# Patient Record
Sex: FEMALE | Race: WHITE | NOT HISPANIC OR LATINO | URBAN - METROPOLITAN AREA
[De-identification: names, ages, dates, MRNs, and addresses within clinical notes are randomized per-mention and may not be internally consistent; named-entity substitution may affect disease eponyms.]

---
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
## 2025-05-08 ENCOUNTER — TRANSCRIBE ORDERS (OUTPATIENT)
Dept: SCHEDULING | Facility: REHABILITATION | Age: 20
End: 2025-05-08

## 2025-05-08 DIAGNOSIS — H55.09 NYSTAGMUS, VESTIBULAR: ICD-10-CM

## 2025-05-08 DIAGNOSIS — S06.0X0A CONCUSSION WITH NO LOSS OF CONSCIOUSNESS: Primary | ICD-10-CM

## 2025-05-08 DIAGNOSIS — H81.90 NYSTAGMUS, VESTIBULAR: ICD-10-CM

## 2025-05-29 ENCOUNTER — HOSPITAL ENCOUNTER (OUTPATIENT)
Dept: PHYSICAL THERAPY | Facility: REHABILITATION | Age: 20
Setting detail: THERAPIES SERIES
Discharge: HOME | End: 2025-05-29
Attending: FAMILY MEDICINE
Payer: COMMERCIAL

## 2025-05-29 DIAGNOSIS — S06.0X0A CONCUSSION WITH NO LOSS OF CONSCIOUSNESS: ICD-10-CM

## 2025-05-29 DIAGNOSIS — S06.0X0A CONCUSSION WITHOUT LOSS OF CONSCIOUSNESS, INITIAL ENCOUNTER: Primary | ICD-10-CM

## 2025-05-29 DIAGNOSIS — H55.09 NYSTAGMUS, VESTIBULAR: ICD-10-CM

## 2025-05-29 DIAGNOSIS — H81.90 NYSTAGMUS, VESTIBULAR: ICD-10-CM

## 2025-05-29 PROBLEM — R55 NEAR SYNCOPE: Status: ACTIVE | Noted: 2021-08-03

## 2025-05-29 PROBLEM — M62.9 MUSCULOSKELETAL DISORDER INVOLVING UPPER TRAPEZIUS MUSCLE: Status: ACTIVE | Noted: 2025-05-29

## 2025-05-29 PROBLEM — R00.2 PALPITATIONS: Status: ACTIVE | Noted: 2021-08-03

## 2025-05-29 PROBLEM — G89.29 CHRONIC NECK PAIN: Status: ACTIVE | Noted: 2025-05-29

## 2025-05-29 PROBLEM — G90.A POSTURAL ORTHOSTATIC TACHYCARDIA SYNDROME (POTS): Status: ACTIVE | Noted: 2025-05-29

## 2025-05-29 PROBLEM — M54.2 CHRONIC NECK PAIN: Status: ACTIVE | Noted: 2025-05-29

## 2025-05-29 PROCEDURE — 97163 PT EVAL HIGH COMPLEX 45 MIN: CPT | Mod: GP

## 2025-05-29 PROCEDURE — 97530 THERAPEUTIC ACTIVITIES: CPT | Mod: GP

## 2025-05-29 RX ORDER — LORATADINE 10 MG/1
10 TABLET ORAL DAILY
COMMUNITY

## 2025-05-29 RX ORDER — SUMATRIPTAN SUCCINATE 25 MG/1
25 TABLET ORAL ONCE AS NEEDED
COMMUNITY
Start: 2021-06-01

## 2025-05-29 RX ORDER — DEXTROAMPHETAMINE SACCHARATE, AMPHETAMINE ASPARTATE, DEXTROAMPHETAMINE SULFATE AND AMPHETAMINE SULFATE 1.25; 1.25; 1.25; 1.25 MG/1; MG/1; MG/1; MG/1
5 TABLET ORAL AS NEEDED
COMMUNITY

## 2025-05-30 ENCOUNTER — DOCUMENTATION (OUTPATIENT)
Dept: SOCIAL WORK | Facility: REHABILITATION | Age: 20
End: 2025-05-30
Payer: COMMERCIAL

## 2025-06-04 ENCOUNTER — HOSPITAL ENCOUNTER (OUTPATIENT)
Dept: PHYSICAL THERAPY | Facility: REHABILITATION | Age: 20
Setting detail: THERAPIES SERIES
Discharge: HOME | End: 2025-06-04
Attending: FAMILY MEDICINE
Payer: COMMERCIAL

## 2025-06-04 DIAGNOSIS — H55.09 NYSTAGMUS, VESTIBULAR: ICD-10-CM

## 2025-06-04 DIAGNOSIS — H81.90 NYSTAGMUS, VESTIBULAR: ICD-10-CM

## 2025-06-04 DIAGNOSIS — S06.0X0A CONCUSSION WITHOUT LOSS OF CONSCIOUSNESS, INITIAL ENCOUNTER: Primary | ICD-10-CM

## 2025-06-04 PROCEDURE — 97530 THERAPEUTIC ACTIVITIES: CPT | Mod: GP

## 2025-06-04 PROCEDURE — 97112 NEUROMUSCULAR REEDUCATION: CPT | Mod: GP
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
## 2025-06-04 NOTE — OP PT TREATMENT LOG
P.T. TREATMENT LOG    Treatment Current Session Time    CANALITH  REPOSITIONING TREATMENT  (CPT 69241) Y/N Notes Not performed SYMPTOMS   CRT       NEURO RE-ED  (CPT 18165) Y/N Notes 23-37 Minutes   SYMPTOMS   Oculomotor exam yes See vestibular chapter    BPPV ASSESSMENT yes See vestibular chapter    VOR CANCELLATION                       Standing H/VVOR-C yes See vestibular chapter   (Assessed - unable to tolerate full 60s without > 2 point inc in symptoms, especially in vertical plane)    Ambulation w/ H/VVOR-C      VOR / GAZE STABILITY      Standing H/VVOR yes See vestibular chapter   (Assessed - unable to perform at 120bpm without symptom increase > 2 points from baseline especially in vertical plane).    Ambulation w/H/VVOR      H/VVOR on compliant surfaces      H/VVOR on sway surface      H/VVOR w/ linear movement      Functional VOR      HABITUATION      Bending      Turning      Figure 8s      Repetitive functional movements      BALANCE- STATIC      On floor      Airex foam      Rockerboard      LOS TRAINING      Weight shifting w/ ankle strategy      Reaching w/ ankle strategy      Biodex training      SLS STABILITY      Alternating toe taps      Slow marching      High knee walking      Cone tapping       BALANCE- DYNAMIC      Ambulation-head turns/nods      Ambulation - 180 & 360 degree turns      Retro ambulation EO      Ambulation with EC      Obstacles      Tandem      REACTIVE BALANCE      Perturbation on rocker/airex      Ball tap on airex      Airex/rocker step ups      Rebounder ball toss on rocker      Ball tap while walking      STS w/ TB around hips      Backward step to bounce off large therapy ball > fwd step      Lateral step w/ TB around hips      Backward step w/ TB around hips      LE COORDINATION      Floor ladder      4 squares      MULTITASKING      Reading task w/ gait activity      Cognitive task w/ gait activity      Negotiating obstacles while carrying ball on cone      OBJECTIVE  MEASURES      modMSQ yes 6/4 (FFU): 6.4 with 5 positions, mild    MSQ      FGA      SOT      DVA      Gait Speed      BCTT      THER-EX   (CPT 48302) Y/N SETS REPS LOAD Not performed SYMPTOMS   STRETCHING                           STRENGTHENING         LE STRENGTHENING                                                      CARDIOVASCULAR      NuStep      Recumbent bike      Treadmill      THER ACT  (CPT 80324) Y/N  25 Minutes SYMPTOMS   Review pain, meds, falls, vitals, symptoms y Reviewed with patient    *Subjective Measures       DHI  IE: 44                       ABC  IE: 84.3    HEP y - 20 minute daily walks maintaining symptoms within 2 points from baseline  - VOR x1 @ 100bpm x60s ea, 3-4x/daily (reviewed maintaining symptoms 2 points from baseline, reducing speed if symptoms are increasing past 2 point threshold or if symptoms lasting > 20 minutes).  - VOR-C @ SSV x30s ea, 3x/day    Therapeutic Rest yes Throughout session for symptom management    Patient Education yes Educated pt on rationale for assessments performed today, findings of assessments, downbeating nystagmus consistent with central origin possibly 2' to concussion, purpose of the VOR and rationale for assessment/provided HEP, technique/form of exercises, rationale for motion sensitivity assessment and plan to address in the future, results of MSQ, possible reasons for increased eye strain -Pt verbalized understanding.           modified Motion Sensitivity Test     Date__6/4/25_    All movements are done in standing, eyes are open, in front of a plain wall  Symptoms must return to baseline before the next movement is performed  Mayelin records the change of intensity from baseline, then after the movement is completed    Intensity 0-10  Duration <5 s =0  Score = Intensity + Duration    0 = none   5-10 s =1    10 = severe   11-20s =2                                                   21-30s =3                                                   >30  s =4    Baseline Symptoms = 0  MOVEMENT Intensity Duration Score   1. 5x Horizontal head turns 1 13s (2) 3   2. 5x Vertical head turns 1 15s (2) 3   3. 5x Right diagonal head turns  (upper left down to right) 0     4. 5x Left diagonal head turns  (upper right down to left) 0     5. 5x Trunk Bends  (bending knees reaching to floor) 2 21s (3) 5   6. 5x Right quarter body turns  (Look over right shoulder with trunk rotation, feet planted) 0     7. 5x Left quarter body turns (Look over left shoulder with trunk rotation, feet planted) 1 9s (1) 2   8. 1x 360 degree turn to right 0     9. 1x 360 degree turn to left 0     10. 5x VOR cancellation  (follow thumbs horizontally with head/trunk rotation X45 degrees  each way) 2 30s (3) 5   Total score   18   mMSQ = Total score x (# of positions) / 14.00 MSQ = 6.4  0-10 mild range  11-30 moderate   severe

## 2025-06-06 ENCOUNTER — HOSPITAL ENCOUNTER (OUTPATIENT)
Dept: PHYSICAL THERAPY | Facility: REHABILITATION | Age: 20
Setting detail: THERAPIES SERIES
Discharge: HOME | End: 2025-06-06
Attending: FAMILY MEDICINE
Payer: COMMERCIAL

## 2025-06-06 DIAGNOSIS — S06.0X0A CONCUSSION WITHOUT LOSS OF CONSCIOUSNESS, INITIAL ENCOUNTER: Primary | ICD-10-CM

## 2025-06-06 DIAGNOSIS — H81.90 NYSTAGMUS, VESTIBULAR: ICD-10-CM

## 2025-06-06 DIAGNOSIS — H55.09 NYSTAGMUS, VESTIBULAR: ICD-10-CM

## 2025-06-06 PROCEDURE — 97112 NEUROMUSCULAR REEDUCATION: CPT | Mod: GP

## 2025-06-06 PROCEDURE — 97530 THERAPEUTIC ACTIVITIES: CPT | Mod: GP

## 2025-06-06 NOTE — OP PT TREATMENT LOG
"P.T. TREATMENT LOG    Treatment Current Session Time    CANALITH  REPOSITIONING TREATMENT  (CPT 57451) Y/N Notes Not performed SYMPTOMS   CRT       NEURO RE-ED  (CPT 62546) Y/N Notes 23-37 Minutes   SYMPTOMS   Oculomotor exam  See vestibular chapter    BPPV ASSESSMENT  See vestibular chapter    VOR CANCELLATION                       Standing H/VVOR-C Reviewed verbally for HEP See vestibular chapter   (Assessed - unable to tolerate full 60s without > 2 point inc in symptoms, especially in vertical plane)    Ambulation w/ H/VVOR-C      VOR / GAZE STABILITY      Standing H/VVOR yes BASELINE: 1.5/10 eye strain  Standing 4' from 1\" B on blue background:  HVOR @ 100 > 105bpm x60s: eye strain inc 2.5/10.  1/10 dizziness.    - standing rest for symptom resolution  VVOR @ 100 > 103bpm x60s: eye train inc to 2.5/10 and 1/10 dizziness.     Ambulation w/H/VVOR      H/VVOR on compliant surfaces      H/VVOR on sway surface      H/VVOR w/ linear movement      Functional VOR      HABITUATION      Bending      Turning      Figure 8s      Repetitive functional movements      BALANCE- STATIC      On floor      Airex foam      Rockerboard      LOS TRAINING      Weight shifting w/ ankle strategy      Reaching w/ ankle strategy      Biodex training      SLS STABILITY      Alternating toe taps      Slow marching      High knee walking      Cone tapping       BALANCE- DYNAMIC      Ambulation-head turns/nods      Ambulation - 180 & 360 degree turns      Retro ambulation EO      Ambulation with EC      Obstacles      Tandem      REACTIVE BALANCE      Perturbation on rocker/airex      Ball tap on airex      Airex/rocker step ups      Rebounder ball toss on rocker      Ball tap while walking      STS w/ TB around hips      Backward step to bounce off large therapy ball > fwd step      Lateral step w/ TB around hips      Backward step w/ TB around hips      LE COORDINATION      Floor ladder      4 squares      MULTITASKING      Reading task w/ " gait activity      Cognitive task w/ gait activity      Negotiating obstacles while carrying ball on cone      OBJECTIVE MEASURES      modMSQ  6/4 (FFU): 6.4 with 5 positions, mild    MSQ yes 6/6/25: 3.9% with 6 positions    FGA      SOT yes 6/6/25: all WNL    DVA      Gait Speed      BCTT      THER-EX   (CPT 61682) Y/N SETS REPS LOAD Not performed SYMPTOMS   STRETCHING                           STRENGTHENING         LE STRENGTHENING                                                      CARDIOVASCULAR      NuStep      Recumbent bike      Treadmill      THER ACT  (CPT 97450) Y/N  20 Minutes SYMPTOMS   Review pain, meds, falls, vitals, symptoms y Reviewed with patient    *Subjective Measures       DHI  IE: 44                       ABC  IE: 84.3    HEP y - 20 minute daily walks maintaining symptoms within 2 points from baseline  - VOR x1 @ 100bpm x60s ea, 3-4x/daily (reviewed maintaining symptoms 2 points from baseline, reducing speed if symptoms are increasing past 2 point threshold or if symptoms lasting > 20 minutes).  - VOR-C @ SSV x30s ea, 3x/day    Therapeutic Rest yes Throughout session for symptom management    Patient Education yes Educated pt on rationale for MSQ, finding of MSQ suggesting mild motion sensitivity, Educated pt on rationale of assessing SOT, procedure of SOT, results of SOTT, and balance integration strategies. Educated pt on progressing gaze stability, reviewed HEP and VOR-C verbally for HEP, provided VOR-C handout.  Educated pt on use of sunglasses/blue light lenses to reduce eye strain with bright lights/blue light; educated pt on use of night mode and lowering screen brightness for ipad/iphone/laptop.  Educated pt on OT referral and discussing symptoms with Dr. Quach.   -Pt verbalized understanding.           Date: _6/6/25 Baseline Level (0-5): 0  MSQ: 3.9%      Position Change Intensity Adjusted Intensity Duration Score   1.Sitting to Supine 0      2.Supine to Left Side Lying 0       3.Supine to Right Side Lying 0      4.Supine to Sitting 0.5  7s (1)  1.5   5.Left Hallpike 0.5  > 30s (3) 3.5   6.Return to Sitting 1  22s (2) 3   7.Right Hallpike 0      8.Return to Sitting 0.5  8s (1) 1.5   9.Sitting to Nose to Left Knee 0.5  7s (1) 1.5   10.Return to Sitting 0      11.Sitting to Nose to Right Knee 0.5  6s (1)    12.Return to Sitting 0      13.Sitting with Head Rotation (Side-Side 5x) 0      14.Sitting with Head Flex & Ext (Nod 5x) 0.5  16s (2) 2.5   15.Standing with Turn 180* to Right 0      16.Standing with Turn 180* to Left           Total   Score = 13.5      Intensity: Scale from 0 to 5 (0 = No sx; 5 = Severe sx per pt's subjective report)  Adjusted Intensity: Intensity Level - Baseline Level  Duration: Scale from 0 to 3 for return to baseline (5-10 sec = 1, 11-30 sec = 2, >30 sec = 3)  Score: Adjusted Intensity + Duration    Motion Sensitivity Quotient: # Provoking Positions x Total Score x100 =  ________________                                                                               2048                                 *If <16 positions are tested, 2048 is replaced with 8 x (# of positions tested)2   (ex: Tested 12 positions, so 8 x (12)2 = 1152 and new MSQ is (# Provoking Positions x Total Score)/1152    Vince FW, Mauricio MJ. Validity and reliability of the Motion Sensitivity Test.    Journal of Rehabilitation Research and Development. 2003;40(5):415-422.        modified Motion Sensitivity Test     Date__6/4/25_    All movements are done in standing, eyes are open, in front of a plain wall  Symptoms must return to baseline before the next movement is performed  Mayelin records the change of intensity from baseline, then after the movement is completed    Intensity 0-10  Duration <5 s =0  Score = Intensity + Duration    0 = none   5-10 s =1    10 = severe   11-20s =2                                                   21-30s =3                                                   >30  s =4    Baseline Symptoms = 0  MOVEMENT Intensity Duration Score   1. 5x Horizontal head turns 1 13s (2) 3   2. 5x Vertical head turns 1 15s (2) 3   3. 5x Right diagonal head turns  (upper left down to right) 0     4. 5x Left diagonal head turns  (upper right down to left) 0     5. 5x Trunk Bends  (bending knees reaching to floor) 2 21s (3) 5   6. 5x Right quarter body turns  (Look over right shoulder with trunk rotation, feet planted) 0     7. 5x Left quarter body turns (Look over left shoulder with trunk rotation, feet planted) 1 9s (1) 2   8. 1x 360 degree turn to right 0     9. 1x 360 degree turn to left 0     10. 5x VOR cancellation  (follow thumbs horizontally with head/trunk rotation X45 degrees  each way) 2 30s (3) 5   Total score   18   mMSQ = Total score x (# of positions) / 14.00 MSQ = 6.4  0-10 mild range  11-30 moderate   severe
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
## 2025-06-10 ENCOUNTER — HOSPITAL ENCOUNTER (OUTPATIENT)
Dept: PHYSICAL THERAPY | Facility: REHABILITATION | Age: 20
Setting detail: THERAPIES SERIES
Discharge: HOME | End: 2025-06-10
Attending: FAMILY MEDICINE
Payer: COMMERCIAL

## 2025-06-10 DIAGNOSIS — H55.09 NYSTAGMUS, VESTIBULAR: ICD-10-CM

## 2025-06-10 DIAGNOSIS — H81.90 NYSTAGMUS, VESTIBULAR: ICD-10-CM

## 2025-06-10 DIAGNOSIS — S06.0X0A CONCUSSION WITHOUT LOSS OF CONSCIOUSNESS, INITIAL ENCOUNTER: Primary | ICD-10-CM

## 2025-06-10 PROCEDURE — 97112 NEUROMUSCULAR REEDUCATION: CPT | Mod: GP

## 2025-06-10 PROCEDURE — 97530 THERAPEUTIC ACTIVITIES: CPT | Mod: GP

## 2025-06-10 RX ORDER — AMITRIPTYLINE HYDROCHLORIDE 25 MG/1
25 TABLET, FILM COATED ORAL NIGHTLY
COMMUNITY
Start: 2025-06-09
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
## 2025-06-10 NOTE — OP PT TREATMENT LOG
"P.T. TREATMENT LOG    Treatment Current Session Time    CANALITH  REPOSITIONING TREATMENT  (CPT 76913) Y/N Notes Not performed SYMPTOMS   CRT       NEURO RE-ED  (CPT 06954) Y/N Notes 23-37 Minutes   SYMPTOMS   Oculomotor exam  See vestibular chapter    BPPV ASSESSMENT  See vestibular chapter    VOR CANCELLATION                       Standing H/VVOR-C  See vestibular chapter   (Assessed - unable to tolerate full 60s without > 2 point inc in symptoms, especially in vertical plane)    Ambulation w/ H/VVOR-C      VOR / GAZE STABILITY      Standing H/VVOR Yes - all BASELINE: 0/10 eye strain  Standing 4' from 1\" B on blue background:  HVOR @ 105>110 bpm x60s: 0.5/10 dizziness, target clear    - standing rest for symptom resolution    VVOR @ 103 > bpm x60s: 0.5/10 dizziness, \"and a little bit of eye strain when my head is down and I'm looking at the B\"    Repeated above with busy background (red/white polka dots):  HVOR @ 110 bpm x60s: 1/10 dizziness, 2/10 eye strain.  Target clear but dots blurry.     VVOR @ 110 bpm x60s: 1/10 dizziness, 2/10 eye strain.      Ambulation w/H/VVOR      H/VVOR on compliant surfaces      H/VVOR on sway surface      H/VVOR w/ linear movement      Functional VOR      HABITUATION      Bending      Turning      Figure 8s      Repetitive functional movements      BALANCE- STATIC      On floor      Airex foam      Rockerboard      LOS TRAINING      Weight shifting w/ ankle strategy      Reaching w/ ankle strategy      Biodex training      SLS STABILITY      Alternating toe taps      Slow marching      High knee walking      Cone tapping       BALANCE- DYNAMIC      Ambulation-head turns/nods      Ambulation - 180 & 360 degree turns      Retro ambulation EO      Ambulation with EC      Obstacles      Tandem      REACTIVE BALANCE      Perturbation on rocker/airex      Ball tap on airex      Airex/rocker step ups      Rebounder ball toss on rocker      Ball tap while walking      STS w/ TB around hips  "     Backward step to bounce off large therapy ball > fwd step      Lateral step w/ TB around hips      Backward step w/ TB around hips      LE COORDINATION      Floor ladder      4 squares      MULTITASKING      Reading task w/ gait activity      Cognitive task w/ gait activity      Negotiating obstacles while carrying ball on cone      OBJECTIVE MEASURES      modMSQ  6/4 (FFU): 6.4 with 5 positions, mild    MSQ  6/6/25: 3.9% with 6 positions    FGA      SOT  6/6/25: all WNL    DVA      Gait Speed      BCTT yes 6/10: passed    THER-EX   (CPT 26524) Y/N SETS REPS LOAD Not performed SYMPTOMS   STRETCHING                           STRENGTHENING         LE STRENGTHENING                                                      CARDIOVASCULAR      NuStep      Recumbent bike      Treadmill      THER ACT  (CPT 65768) Y/N  20 Minutes SYMPTOMS   Review pain, meds, falls, vitals, symptoms y Reviewed with patient    *Subjective Measures       DHI  IE: 44                       ABC  IE: 84.3    HEP y - CV activity (see below)   - VOR x1 @ 110bpm x60s ea on busy background, 3-4x/daily (reviewed maintaining symptoms 2 points from baseline, reducing speed if symptoms are increasing past 2 point threshold or if symptoms lasting > 20 minutes).  - VOR-C @ SSV x30s ea, 3x/day    Therapeutic Rest yes Throughout session for symptom management    Patient Education yes Educated pt on rationale for BCTT, procedure of BCTT, results of BCTT and associated home cardio program, progression of gaze stability, scheduling OT evaluation at next visit, POC, HEP  -Pt verbalized understanding.         HOME CARDIO PROGRAM  Walk, indoor bike, or swim  5 minute warm up to allow heart rate to gradually increase  20 minute active phase between 135-170bpm (or RPE between 11-15 per chart below)  5 minute cool down to allow heart rate to gradually decrease   3-7 days / week         [facility] Concussion Treadmill Test  Date:  6/10/25    Baseline overall symptom level  = 0  60% MHR = 115         70% MHR = 134  80% MHR = 154  90% MHR = 173    Test performed at speed 3.3    Minute Incline HR RPE Symptoms Comments   0 0 112 6 0    1 1% 128 7 0    2 2% 126 8 0    3 3% 128 8 0    4 4% 130 8 0    5 5% 128 9 0    6 6% 131 9 0    7 7% 138 9 0    8 8% 150 9 0    9 9% 147 9 0    10 10% 160 10 0    11 11% 166 10 0    12 12% 168 10 0    13 13% 171 11 0    14 14% 174 11 0    COOL DOWN        15 0%, 2.0mph 173 9 0    16 0%, 2.0mph 151 7 0    17 0%, 2.0mph 149 6 0      Posttest assessment:   Test stopped at: 14 minutes due to pt achieved 90% of age predicted HR max without increase in symptoms  Post test vitals = 118/79; 119bpm; 97%   Overall symptom level = no change       Date: _6/6/25 Baseline Level (0-5): 0  MSQ: 3.9%      Position Change Intensity Adjusted Intensity Duration Score   1.Sitting to Supine 0      2.Supine to Left Side Lying 0      3.Supine to Right Side Lying 0      4.Supine to Sitting 0.5  7s (1)  1.5   5.Left Hallpike 0.5  > 30s (3) 3.5   6.Return to Sitting 1  22s (2) 3   7.Right Hallpike 0      8.Return to Sitting 0.5  8s (1) 1.5   9.Sitting to Nose to Left Knee 0.5  7s (1) 1.5   10.Return to Sitting 0      11.Sitting to Nose to Right Knee 0.5  6s (1)    12.Return to Sitting 0      13.Sitting with Head Rotation (Side-Side 5x) 0      14.Sitting with Head Flex & Ext (Nod 5x) 0.5  16s (2) 2.5   15.Standing with Turn 180* to Right 0      16.Standing with Turn 180* to Left           Total   Score = 13.5      Intensity: Scale from 0 to 5 (0 = No sx; 5 = Severe sx per pt's subjective report)  Adjusted Intensity: Intensity Level - Baseline Level  Duration: Scale from 0 to 3 for return to baseline (5-10 sec = 1, 11-30 sec = 2, >30 sec = 3)  Score: Adjusted Intensity + Duration    Motion Sensitivity Quotient: # Provoking Positions x Total Score x100 =  ________________                                                                               2048                                  *If <16 positions are tested, 2048 is replaced with 8 x (# of positions tested)2   (ex: Tested 12 positions, so 8 x (12)2 = 1152 and new MSQ is (# Provoking Positions x Total Score)/1152    Vince FW, Mauricio SPAIN. Validity and reliability of the Motion Sensitivity Test.    Journal of Rehabilitation Research and Development. 2003;40(5):415-422.        modified Motion Sensitivity Test     Date__6/4/25_    All movements are done in standing, eyes are open, in front of a plain wall  Symptoms must return to baseline before the next movement is performed  Mayelin records the change of intensity from baseline, then after the movement is completed    Intensity 0-10  Duration <5 s =0  Score = Intensity + Duration    0 = none   5-10 s =1    10 = severe   11-20s =2                                                   21-30s =3                                                   >30 s =4    Baseline Symptoms = 0  MOVEMENT Intensity Duration Score   1. 5x Horizontal head turns 1 13s (2) 3   2. 5x Vertical head turns 1 15s (2) 3   3. 5x Right diagonal head turns  (upper left down to right) 0     4. 5x Left diagonal head turns  (upper right down to left) 0     5. 5x Trunk Bends  (bending knees reaching to floor) 2 21s (3) 5   6. 5x Right quarter body turns  (Look over right shoulder with trunk rotation, feet planted) 0     7. 5x Left quarter body turns (Look over left shoulder with trunk rotation, feet planted) 1 9s (1) 2   8. 1x 360 degree turn to right 0     9. 1x 360 degree turn to left 0     10. 5x VOR cancellation  (follow thumbs horizontally with head/trunk rotation X45 degrees  each way) 2 30s (3) 5   Total score   18   mMSQ = Total score x (# of positions) / 14.00 MSQ = 6.4  0-10 mild range  11-30 moderate   severe

## 2025-06-12 ENCOUNTER — HOSPITAL ENCOUNTER (OUTPATIENT)
Dept: PHYSICAL THERAPY | Facility: REHABILITATION | Age: 20
Setting detail: THERAPIES SERIES
Discharge: HOME | End: 2025-06-12
Attending: FAMILY MEDICINE
Payer: COMMERCIAL

## 2025-06-12 DIAGNOSIS — H55.09 NYSTAGMUS, VESTIBULAR: ICD-10-CM

## 2025-06-12 DIAGNOSIS — H81.90 NYSTAGMUS, VESTIBULAR: ICD-10-CM

## 2025-06-12 DIAGNOSIS — S06.0X0A CONCUSSION WITHOUT LOSS OF CONSCIOUSNESS, INITIAL ENCOUNTER: Primary | ICD-10-CM

## 2025-06-12 PROCEDURE — 97112 NEUROMUSCULAR REEDUCATION: CPT | Mod: GP

## 2025-06-12 PROCEDURE — 97530 THERAPEUTIC ACTIVITIES: CPT | Mod: GP
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
## 2025-06-12 NOTE — OP PT TREATMENT LOG
"P.T. TREATMENT LOG    Treatment Current Session Time    CANALITH  REPOSITIONING TREATMENT  (CPT 53051) Y/N Notes Not performed SYMPTOMS   CRT       NEURO RE-ED  (CPT 50440) Y/N Notes 38-52 Minutes   SYMPTOMS   Oculomotor exam  See vestibular chapter    BPPV ASSESSMENT  See vestibular chapter    VOR CANCELLATION                       Standing H/VVOR-C  See vestibular chapter   (Assessed - unable to tolerate full 60s without > 2 point inc in symptoms, especially in vertical plane)    Ambulation w/ H/VVOR-C      VOR / GAZE STABILITY      Standing H/VVOR   No                    yes BASELINE: 0/10 eye strain  Standing 4' from 1\" B on blue background:  HVOR @ 105>110 bpm x60s: 0.5/10 dizziness, target clear    - standing rest for symptom resolution    VVOR @ 103 > 110bpm x60s: 0.5/10 dizziness, \"and a little bit of eye strain when my head is down and I'm looking at the B\"    Repeated above with busy background (balloons):  HVOR @ 110 > 120  bpm x60s: no dizziness, balloons were blurry but B was clear.     VVOR @ 110 > 120 bpm x60s: 0/10 dizziness, 1.5/10 eye strain     Ambulation w/H/VVOR      H/VVOR on compliant surfaces yes On foam, 4' from 1\" B on balloon background:  HVOR @ 120 x60s: some initial difficulty maintaining speed. No eye strain, \"a little wobbly\"   VVOR @ 120 x60s: eye strain 1.5/10, \"feels a little wobbly but left and right was more off\".      H/VVOR on sway surface      H/VVOR w/ linear movement      Functional VOR      HABITUATION      Bending yes Blazepods:  1) 3 on lowered mat, 3 on floor.  Light delay random between 2-4s.  X2 min. No significant dizziness  2) All pods on floor in small arc in front of pt, light delay to 0.5-2s.  X2 min.  1/10 dizziness @ 1:15s left, paused to allow rest.  1/10 dizziness at @ 33s left, paused to allow rest.  At end, almost 1/10 dizziness.  Seated rest before proceeding to 3.  3) repeated 2. 1/10 dizziness @ 1:20s left, 1/1 dizziness at 33s left, 1/10 dizziness at end. "    Rest breaks throughout for symptom management.     Turning      Figure 8s      Repetitive functional movements      BALANCE- STATIC      On floor      Airex foam      Rockerboard      LOS TRAINING      Weight shifting w/ ankle strategy      Reaching w/ ankle strategy      Biodex training      SLS STABILITY      Alternating toe taps      Slow marching      High knee walking      Cone tapping       BALANCE- DYNAMIC      Ambulation-head turns/nods      Ambulation - 180 & 360 degree turns      Retro ambulation EO      Ambulation with EC      Obstacles      Tandem      REACTIVE BALANCE      Perturbation on rocker/airex      Ball tap on airex      Airex/rocker step ups      Rebounder ball toss on rocker      Ball tap while walking      STS w/ TB around hips      Backward step to bounce off large therapy ball > fwd step      Lateral step w/ TB around hips      Backward step w/ TB around hips      LE COORDINATION      Floor ladder      4 squares      MULTITASKING      Reading task w/ gait activity      Cognitive task w/ gait activity      Negotiating obstacles while carrying ball on cone      OBJECTIVE MEASURES      modMSQ  6/4 (FFU): 6.4 with 5 positions, mild    MSQ  6/6/25: 3.9% with 6 positions    FGA yes 6/12/25: 30/30 (WNL)    SOT  6/6/25: all WNL    DVA      Gait Speed      BCTT  6/10: passed    THER-EX   (CPT 22201) Y/N SETS REPS LOAD Not performed SYMPTOMS   STRETCHING                           STRENGTHENING         LE STRENGTHENING                                                      CARDIOVASCULAR      NuStep      Recumbent bike      Treadmill      THER ACT  (CPT 88195) Y/N  15  Minutes SYMPTOMS   Review pain, meds, falls, vitals, symptoms y Reviewed with patient    *Subjective Measures       DHI  IE: 44                       ABC  IE: 84.3    HEP y - CV activity (see below)   - VOR x1 @ 120bpm x60s ea on busy background, standing on cushion, 3-4x/daily (reviewed maintaining symptoms 2 points from baseline,  reducing speed if symptoms are increasing past 2 point threshold or if symptoms lasting > 20 minutes).  - VOR-C @ SSV x30s ea, 3x/day    Therapeutic Rest yes Throughout session for symptom management    Patient Education yes Educated pt on rationale for positional testing, findings of positional testing inconsistent with BPPV, rationale for assessment of FGA and outcome of FGA, progression of gaze stability, habituation to bending  -Pt verbalized understanding.         HOME CARDIO PROGRAM  Walk, indoor bike, or swim  5 minute warm up to allow heart rate to gradually increase  20 minute active phase between 135-170bpm (or RPE between 11-15 per chart below)  5 minute cool down to allow heart rate to gradually decrease   3-7 days / week         [facility] Concussion Treadmill Test  Date:  6/10/25    Baseline overall symptom level = 0  60% MHR = 115         70% MHR = 134  80% MHR = 154  90% MHR = 173    Test performed at speed 3.3    Minute Incline HR RPE Symptoms Comments   0 0 112 6 0    1 1% 128 7 0    2 2% 126 8 0    3 3% 128 8 0    4 4% 130 8 0    5 5% 128 9 0    6 6% 131 9 0    7 7% 138 9 0    8 8% 150 9 0    9 9% 147 9 0    10 10% 160 10 0    11 11% 166 10 0    12 12% 168 10 0    13 13% 171 11 0    14 14% 174 11 0    COOL DOWN        15 0%, 2.0mph 173 9 0    16 0%, 2.0mph 151 7 0    17 0%, 2.0mph 149 6 0      Posttest assessment:   Test stopped at: 14 minutes due to pt achieved 90% of age predicted HR max without increase in symptoms  Post test vitals = 118/79; 119bpm; 97%   Overall symptom level = no change       Date: _6/6/25 Baseline Level (0-5): 0  MSQ: 3.9%      Position Change Intensity Adjusted Intensity Duration Score   1.Sitting to Supine 0      2.Supine to Left Side Lying 0      3.Supine to Right Side Lying 0      4.Supine to Sitting 0.5  7s (1)  1.5   5.Left Hallpike 0.5  > 30s (3) 3.5   6.Return to Sitting 1  22s (2) 3   7.Right Hallpike 0      8.Return to Sitting 0.5  8s (1) 1.5   9.Sitting to Nose to  Left Knee 0.5  7s (1) 1.5   10.Return to Sitting 0      11.Sitting to Nose to Right Knee 0.5  6s (1)    12.Return to Sitting 0      13.Sitting with Head Rotation (Side-Side 5x) 0      14.Sitting with Head Flex & Ext (Nod 5x) 0.5  16s (2) 2.5   15.Standing with Turn 180* to Right 0      16.Standing with Turn 180* to Left           Total   Score = 13.5      Intensity: Scale from 0 to 5 (0 = No sx; 5 = Severe sx per pt's subjective report)  Adjusted Intensity: Intensity Level - Baseline Level  Duration: Scale from 0 to 3 for return to baseline (5-10 sec = 1, 11-30 sec = 2, >30 sec = 3)  Score: Adjusted Intensity + Duration    Motion Sensitivity Quotient: # Provoking Positions x Total Score x100 =  ________________                                                                               2048                                 *If <16 positions are tested, 2048 is replaced with 8 x (# of positions tested)2   (ex: Tested 12 positions, so 8 x (12)2 = 1152 and new MSQ is (# Provoking Positions x Total Score)/1152    Vince FW, Mauricio MJ. Validity and reliability of the Motion Sensitivity Test.    Journal of Rehabilitation Research and Development. 2003;40(5):415-422.        modified Motion Sensitivity Test     Date__6/4/25_    All movements are done in standing, eyes are open, in front of a plain wall  Symptoms must return to baseline before the next movement is performed  Mayelin records the change of intensity from baseline, then after the movement is completed    Intensity 0-10  Duration <5 s =0  Score = Intensity + Duration    0 = none   5-10 s =1    10 = severe   11-20s =2                                                   21-30s =3                                                   >30 s =4    Baseline Symptoms = 0  MOVEMENT Intensity Duration Score   1. 5x Horizontal head turns 1 13s (2) 3   2. 5x Vertical head turns 1 15s (2) 3   3. 5x Right diagonal head turns  (upper left down to right) 0     4. 5x Left diagonal  head turns  (upper right down to left) 0     5. 5x Trunk Bends  (bending knees reaching to floor) 2 21s (3) 5   6. 5x Right quarter body turns  (Look over right shoulder with trunk rotation, feet planted) 0     7. 5x Left quarter body turns (Look over left shoulder with trunk rotation, feet planted) 1 9s (1) 2   8. 1x 360 degree turn to right 0     9. 1x 360 degree turn to left 0     10. 5x VOR cancellation  (follow thumbs horizontally with head/trunk rotation X45 degrees  each way) 2 30s (3) 5   Total score   18   mMSQ = Total score x (# of positions) / 14.00 MSQ = 6.4  0-10 mild range  11-30 moderate   severe

## 2025-06-16 ENCOUNTER — HOSPITAL ENCOUNTER (OUTPATIENT)
Dept: PHYSICAL THERAPY | Facility: REHABILITATION | Age: 20
Setting detail: THERAPIES SERIES
Discharge: HOME | End: 2025-06-16
Attending: FAMILY MEDICINE
Payer: COMMERCIAL

## 2025-06-16 DIAGNOSIS — H55.09 NYSTAGMUS, VESTIBULAR: ICD-10-CM

## 2025-06-16 DIAGNOSIS — H81.90 NYSTAGMUS, VESTIBULAR: ICD-10-CM

## 2025-06-16 DIAGNOSIS — S06.0X0A CONCUSSION WITHOUT LOSS OF CONSCIOUSNESS, INITIAL ENCOUNTER: Primary | ICD-10-CM

## 2025-06-16 PROCEDURE — 97112 NEUROMUSCULAR REEDUCATION: CPT | Mod: GP

## 2025-06-16 PROCEDURE — 97530 THERAPEUTIC ACTIVITIES: CPT | Mod: GP

## 2025-06-16 NOTE — OP PT TREATMENT LOG
"P.T. TREATMENT LOG    Treatment Current Session Time    CANALITH  REPOSITIONING TREATMENT  (CPT 85555) Y/N Notes Not performed SYMPTOMS   CRT       NEURO RE-ED  (CPT 52735) Y/N Notes 38-52 Minutes   SYMPTOMS   Oculomotor exam  See vestibular chapter    BPPV ASSESSMENT  See vestibular chapter    VOR CANCELLATION                       Standing H/VVOR-C  See vestibular chapter   (Assessed - unable to tolerate full 60s without > 2 point inc in symptoms, especially in vertical plane)    Ambulation w/ H/VVOR-C      VOR / GAZE STABILITY      Standing H/VVOR   No                     BASELINE: 0/10 eye strain  Standing 4' from 1\" B on blue background:  HVOR @ 105>110 bpm x60s: 0.5/10 dizziness, target clear    - standing rest for symptom resolution    VVOR @ 103 > 110bpm x60s: 0.5/10 dizziness, \"and a little bit of eye strain when my head is down and I'm looking at the B\"    Repeated above with busy background (balloons):  HVOR @ 110 > 120  bpm x60s: no dizziness, balloons were blurry but B was clear.     VVOR @ 110 > 120 bpm x60s: 0/10 dizziness, 1.5/10 eye strain     Ambulation w/H/VVOR      H/VVOR on compliant surfaces yes On foam, 4' from 1\" B on apple background:  HVOR @ 120 inc to 125 bpm x60s: some initial difficulty maintaining speed. Mild eye strain 1/10, but no dizziness.    VVOR @ 120 inc to 125 bpm x60s: eye strain 1.5/10    H/VVOR on sway surface yes On a/p rocker, 4' from 1\" B on apple background:   - HVOR at 120 bpm x 1 min with report of mild 1/10 eye strain above L eye.  Reports no dizziness, but mild imbalance feeling.    - VVOR at 120 bpm x 1 min.  Reports 1.5/10 eye strain, but no dizziness.      H/VVOR w/ linear movement      Functional VOR      HABITUATION      Bending Yes                      N Blazepods:  - All 6 pods on floor in a small arc in front of pt, no light delay, tap yellow (not blue) when it lights up.  Performed for 1 min with inc dizziness 1.5-2/10.    - All pods on floor in small arc in " front of pt, 1s delay, tap blue pod when it lights up (rest not lit).  Performed for 1 min.  24 hits.  Within the last 10 seconds, felt like dizziness started at 1/10.      1) 3 on lowered mat, 3 on floor.  Light delay random between 2-4s.  X2 min. No significant dizziness  2) All pods on floor in small arc in front of pt, light delay to 0.5-2s.  X2 min.  1/10 dizziness @ 1:15s left, paused to allow rest.  1/10 dizziness at @ 33s left, paused to allow rest.  At end, almost 1/10 dizziness.  Seated rest before proceeding to 3.  3) repeated 2. 1/10 dizziness @ 1:20s left, 1/1 dizziness at 33s left, 1/10 dizziness at end.    Rest breaks throughout for symptom management.     Turning Yes              yes Tennis ball catch from one side and place in basket on other side:   - catch from L, place in basket on R x 20 balls with 2/10 dizziness and 2/10 eye strain.    - catch from R, place in basket on L x 20 balls.  1/10 dizziness and 1/10 L eye strain.     Tennis ball catch from in front, then turn to place in basket behind pt, alternating sides x 20 balls. 0.5/10 dizziness.  1/10 eye strain.     Figure 8s      Repetitive functional movements      BALANCE- STATIC      On floor      Airex foam      Rockerboard      LOS TRAINING      Weight shifting w/ ankle strategy      Reaching w/ ankle strategy      Biodex training      SLS STABILITY      Alternating toe taps      Slow marching      High knee walking      Cone tapping       BALANCE- DYNAMIC      Ambulation-head turns/nods      Ambulation - 180 & 360 degree turns      Retro ambulation EO      Ambulation with EC      Obstacles      Tandem      REACTIVE BALANCE      Perturbation on rocker/airex      Ball tap on airex      Airex/rocker step ups      Rebounder ball toss on rocker      Ball tap while walking      STS w/ TB around hips      Backward step to bounce off large therapy ball > fwd step      Lateral step w/ TB around hips      Backward step w/ TB around hips      LE  COORDINATION      Floor ladder      4 squares      MULTITASKING      Reading task w/ gait activity      Cognitive task w/ gait activity      Negotiating obstacles while carrying ball on cone      OBJECTIVE MEASURES      modMSQ  6/4 (FFU): 6.4 with 5 positions, mild    MSQ  6/6/25: 3.9% with 6 positions    FGA  6/12/25: 30/30 (WNL)    SOT  6/6/25: all WNL    DVA      Gait Speed      BCTT  6/10: passed    THER-EX   (CPT 62066) Y/N SETS REPS LOAD Not performed SYMPTOMS   STRETCHING                           STRENGTHENING         LE STRENGTHENING                                                      CARDIOVASCULAR      NuStep      Recumbent bike      Treadmill      THER ACT  (CPT 44862) Y/N  8-22 Minutes   SYMPTOMS   Review pain, meds, falls, vitals, symptoms yes Reviewed with patient    *Subjective Measures       DHI  IE: 44                       ABC  IE: 84.3    HEP  - CV activity (see below)   - VOR x1 @ 120bpm x60s ea on busy background, standing on cushion, 3-4x/daily (reviewed maintaining symptoms 2 points from baseline, reducing speed if symptoms are increasing past 2 point threshold or if symptoms lasting > 20 minutes).  - VOR-C @ SSV x30s ea, 3x/day    Therapeutic Rest yes Throughout session for symptom management    Patient Education yes 6/16/25: Educated pt to work on her reading tolerance, trying to read on paper for 10 min, then rest for 5 min, then read for another 10 min.  Educated pt that if she is not able to tolerate this much readying, then to work up to this.  Pt is to perform this in order to work on improving her eye strain and reading tolerance while awaiting her OT eval (guidelines given after brief discussion with OT).  H/VVOR to be performed at 125 bpm with a busy background, standing on a pillow.  Pt verbalized understanding and agreement.        Educated pt on rationale for positional testing, findings of positional testing inconsistent with BPPV, rationale for assessment of FGA and outcome of  FGA, progression of gaze stability, habituation to bending  -Pt verbalized understanding.         HOME CARDIO PROGRAM  Walk, indoor bike, or swim  5 minute warm up to allow heart rate to gradually increase  20 minute active phase between 135-170bpm (or RPE between 11-15 per chart below)  5 minute cool down to allow heart rate to gradually decrease   3-7 days / week         [facility] Concussion Treadmill Test  Date:  6/10/25    Baseline overall symptom level = 0  60% MHR = 115         70% MHR = 134  80% MHR = 154  90% MHR = 173    Test performed at speed 3.3    Minute Incline HR RPE Symptoms Comments   0 0 112 6 0    1 1% 128 7 0    2 2% 126 8 0    3 3% 128 8 0    4 4% 130 8 0    5 5% 128 9 0    6 6% 131 9 0    7 7% 138 9 0    8 8% 150 9 0    9 9% 147 9 0    10 10% 160 10 0    11 11% 166 10 0    12 12% 168 10 0    13 13% 171 11 0    14 14% 174 11 0    COOL DOWN        15 0%, 2.0mph 173 9 0    16 0%, 2.0mph 151 7 0    17 0%, 2.0mph 149 6 0      Posttest assessment:   Test stopped at: 14 minutes due to pt achieved 90% of age predicted HR max without increase in symptoms  Post test vitals = 118/79; 119bpm; 97%   Overall symptom level = no change       Date: _6/6/25 Baseline Level (0-5): 0  MSQ: 3.9%      Position Change Intensity Adjusted Intensity Duration Score   1.Sitting to Supine 0      2.Supine to Left Side Lying 0      3.Supine to Right Side Lying 0      4.Supine to Sitting 0.5  7s (1)  1.5   5.Left Hallpike 0.5  > 30s (3) 3.5   6.Return to Sitting 1  22s (2) 3   7.Right Hallpike 0      8.Return to Sitting 0.5  8s (1) 1.5   9.Sitting to Nose to Left Knee 0.5  7s (1) 1.5   10.Return to Sitting 0      11.Sitting to Nose to Right Knee 0.5  6s (1)    12.Return to Sitting 0      13.Sitting with Head Rotation (Side-Side 5x) 0      14.Sitting with Head Flex & Ext (Nod 5x) 0.5  16s (2) 2.5   15.Standing with Turn 180* to Right 0      16.Standing with Turn 180* to Left           Total   Score = 13.5      Intensity: Scale  from 0 to 5 (0 = No sx; 5 = Severe sx per pt's subjective report)  Adjusted Intensity: Intensity Level - Baseline Level  Duration: Scale from 0 to 3 for return to baseline (5-10 sec = 1, 11-30 sec = 2, >30 sec = 3)  Score: Adjusted Intensity + Duration    Motion Sensitivity Quotient: # Provoking Positions x Total Score x100 =  ________________                                                                               2048                                 *If <16 positions are tested, 2048 is replaced with 8 x (# of positions tested)2   (ex: Tested 12 positions, so 8 x (12)2 = 1152 and new MSQ is (# Provoking Positions x Total Score)/1152    Vince FW, Mauricio SPAIN. Validity and reliability of the Motion Sensitivity Test.    Journal of Rehabilitation Research and Development. 2003;40(5):415-422.        modified Motion Sensitivity Test     Date__6/4/25_    All movements are done in standing, eyes are open, in front of a plain wall  Symptoms must return to baseline before the next movement is performed  Mayelin records the change of intensity from baseline, then after the movement is completed    Intensity 0-10  Duration <5 s =0  Score = Intensity + Duration    0 = none   5-10 s =1    10 = severe   11-20s =2                                                   21-30s =3                                                   >30 s =4    Baseline Symptoms = 0  MOVEMENT Intensity Duration Score   1. 5x Horizontal head turns 1 13s (2) 3   2. 5x Vertical head turns 1 15s (2) 3   3. 5x Right diagonal head turns  (upper left down to right) 0     4. 5x Left diagonal head turns  (upper right down to left) 0     5. 5x Trunk Bends  (bending knees reaching to floor) 2 21s (3) 5   6. 5x Right quarter body turns  (Look over right shoulder with trunk rotation, feet planted) 0     7. 5x Left quarter body turns (Look over left shoulder with trunk rotation, feet planted) 1 9s (1) 2   8. 1x 360 degree turn to right 0     9. 1x 360 degree turn to  left 0     10. 5x VOR cancellation  (follow thumbs horizontally with head/trunk rotation X45 degrees  each way) 2 30s (3) 5   Total score   18   mMSQ = Total score x (# of positions) / 14.00 MSQ = 6.4  0-10 mild range  11-30 moderate   severe
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
## 2025-06-18 ENCOUNTER — HOSPITAL ENCOUNTER (OUTPATIENT)
Dept: PHYSICAL THERAPY | Facility: REHABILITATION | Age: 20
Setting detail: THERAPIES SERIES
Discharge: HOME | End: 2025-06-18
Attending: FAMILY MEDICINE
Payer: COMMERCIAL

## 2025-06-18 ENCOUNTER — HOSPITAL ENCOUNTER (OUTPATIENT)
Dept: OCCUPATIONAL THERAPY | Facility: REHABILITATION | Age: 20
Setting detail: THERAPIES SERIES
Discharge: HOME | End: 2025-06-18
Attending: PHYSICAL MEDICINE & REHABILITATION
Payer: COMMERCIAL

## 2025-06-18 DIAGNOSIS — S06.0X0A CONCUSSION WITHOUT LOSS OF CONSCIOUSNESS, INITIAL ENCOUNTER: Primary | ICD-10-CM

## 2025-06-18 DIAGNOSIS — H55.09 NYSTAGMUS, VESTIBULAR: ICD-10-CM

## 2025-06-18 DIAGNOSIS — H81.90 NYSTAGMUS, VESTIBULAR: ICD-10-CM

## 2025-06-18 DIAGNOSIS — H54.7 FUNCTIONAL VISION PROBLEM: ICD-10-CM

## 2025-06-18 PROCEDURE — 97530 THERAPEUTIC ACTIVITIES: CPT | Mod: GP

## 2025-06-18 PROCEDURE — 97112 NEUROMUSCULAR REEDUCATION: CPT | Mod: GP

## 2025-06-18 PROCEDURE — 97166 OT EVAL MOD COMPLEX 45 MIN: CPT | Mod: GO

## 2025-06-18 NOTE — OP PT TREATMENT LOG
"P.T. TREATMENT LOG    Treatment Current Session Time    CANALITH  REPOSITIONING TREATMENT  (CPT 70867) Y/N Notes Not performed SYMPTOMS   CRT       NEURO RE-ED  (CPT 07150) Y/N Notes 38-52 Minutes   SYMPTOMS   Oculomotor exam  See vestibular chapter    BPPV ASSESSMENT  See vestibular chapter    VOR CANCELLATION                       Standing H/VVOR-C Yes - all Standing facing window:   - HVOR-C x 1 min with dizziness inc to 0.5/10, 1.5/10 eye strain.   - VVOR-C x 1 min with 0.5/10 dizziness, 2/10 eye strain    Ambulation w/ H/VVOR-C Yes - all Amb with VOR-C in long hallway with windows:   - HVOR-C x 100 ft with eye strain inc from 1/10 to 1.5/10, no dizziness  - VVOR-C x 100 ft with eye strain inc from 1/10 to 1.5/10 x 100 ft with eye strain inc from 1/10 to 1.5/10    VOR / GAZE STABILITY      Standing H/VVOR   No                     BASELINE: 0/10 eye strain  Standing 4' from 1\" B on blue background:  HVOR @ 105>110 bpm x60s: 0.5/10 dizziness, target clear    - standing rest for symptom resolution    VVOR @ 103 > 110bpm x60s: 0.5/10 dizziness, \"and a little bit of eye strain when my head is down and I'm looking at the B\"    Repeated above with busy background (balloons):  HVOR @ 110 > 120  bpm x60s: no dizziness, balloons were blurry but B was clear.     VVOR @ 110 > 120 bpm x60s: 0/10 dizziness, 1.5/10 eye strain     Ambulation w/H/VVOR yes Walking backward/forward 4 steps, focusing on 1\" B on red with white dot background:   - HVOR at 120 bpm x 1 min with eye strain inc from 1/10 to 1.5/10.  No inc dizziness.   - VVOR at 120 bpm x 1 min with eye strain inc from 1/10 to 1.5/10, no dizziness.      H/VVOR on compliant surfaces yes On foam, 4' from 1\" B on red with white dot background:  -HVOR @ 125 inc to 130 bpm x60s: some initial difficulty maintaining speed. Mild eye strain inc from 1/10 to 1.5/10, but no dizziness.    VVOR @ 125 inc to 130 bpm x60s: eye strain inc from 1/10 to 2/10, but no dizziness.     H/VVOR " "on sway surface  On a/p rocker, 4' from 1\" B on apple background:   - HVOR at 120 bpm x 1 min with report of mild 1/10 eye strain above L eye.  Reports no dizziness, but mild imbalance feeling.    - VVOR at 120 bpm x 1 min.  Reports 1.5/10 eye strain, but no dizziness.      H/VVOR w/ linear movement      Functional VOR      HABITUATION      Bending n                      N Blazepods:  - All 6 pods on floor in a small arc in front of pt, no light delay, tap yellow (not blue) when it lights up.  Performed for 1 min with inc dizziness 1.5-2/10.    - All pods on floor in small arc in front of pt, 1s delay, tap blue pod when it lights up (rest not lit).  Performed for 1 min.  24 hits.  Within the last 10 seconds, felt like dizziness started at 1/10.      1) 3 on lowered mat, 3 on floor.  Light delay random between 2-4s.  X2 min. No significant dizziness  2) All pods on floor in small arc in front of pt, light delay to 0.5-2s.  X2 min.  1/10 dizziness @ 1:15s left, paused to allow rest.  1/10 dizziness at @ 33s left, paused to allow rest.  At end, almost 1/10 dizziness.  Seated rest before proceeding to 3.  3) repeated 2. 1/10 dizziness @ 1:20s left, 1/1 dizziness at 33s left, 1/10 dizziness at end.    Rest breaks throughout for symptom management.     Turning                Tennis ball catch from one side and place in basket on other side:   - catch from L, place in basket on R x 20 balls with 2/10 dizziness and 2/10 eye strain.    - catch from R, place in basket on L x 20 balls.  1/10 dizziness and 1/10 L eye strain.     Tennis ball catch from in front, then turn to place in basket behind pt, alternating sides x 20 balls. 0.5/10 dizziness.  1/10 eye strain.     Bending/turning combined yes Bend to touch one of 6 blaze pods, turn around to retrieve Squigg from window about 10 ft away, turn and bend to place in bucket, then repeat x 2 min with cog multitasking:   -1st rep: 2/10 dizziness, no inc eye strain.   - 2nd rep: " 0/10 dizziness, no inc eye strain    Figure 8s      Repetitive functional movements      BALANCE- STATIC      On floor      Airex foam      Rockerboard      LOS TRAINING      Weight shifting w/ ankle strategy      Reaching w/ ankle strategy      Biodex training      SLS STABILITY      Alternating toe taps      Slow marching      High knee walking      Cone tapping       BALANCE- DYNAMIC      Ambulation-head turns/nods      Ambulation - 180 & 360 degree turns      Retro ambulation EO      Ambulation with EC      Obstacles      Tandem      REACTIVE BALANCE      Perturbation on rocker/airex      Ball tap on airex      Airex/rocker step ups      Rebounder ball toss on rocker      Ball tap while walking      STS w/ TB around hips      Backward step to bounce off large therapy ball > fwd step      Lateral step w/ TB around hips      Backward step w/ TB around hips      LE COORDINATION      Floor ladder      4 squares      MULTITASKING      Reading task w/ gait activity      Cognitive task w/ gait activity      Negotiating obstacles while carrying ball on cone      OBJECTIVE MEASURES      modMSQ  6/4 (FFU): 6.4 with 5 positions, mild    MSQ  6/6/25: 3.9% with 6 positions    FGA  6/12/25: 30/30 (WNL)    SOT  6/6/25: all WNL    DVA      Gait Speed      BCTT  6/10: passed    THER-EX   (CPT 91329) Y/N SETS REPS LOAD Not performed SYMPTOMS   STRETCHING                           STRENGTHENING         LE STRENGTHENING                                                      CARDIOVASCULAR      NuStep      Recumbent bike      Treadmill      THER ACT  (CPT 31683) Y/N  8-22 Minutes   SYMPTOMS   Review pain, meds, falls, vitals, symptoms yes Reviewed with patient    *Subjective Measures       DHI  IE: 44                       ABC  IE: 84.3    HEP  - CV activity (see below)   - VOR x1 @ 120bpm x60s ea on busy background, standing on cushion, 3-4x/daily (reviewed maintaining symptoms 2 points from baseline, reducing speed if symptoms are  increasing past 2 point threshold or if symptoms lasting > 20 minutes).  - VOR-C @ SSV x30s ea, 3x/day    Therapeutic Rest y Throughout session for symptom management    Patient Education yes 6/18/25: Educated pt regarding HEP - inc VOR speeds and continue cardio. Pt verbalized understanding and agreement.     6/16/25: Educated pt to work on her reading tolerance, trying to read on paper for 10 min, then rest for 5 min, then read for another 10 min.  Educated pt that if she is not able to tolerate this much readying, then to work up to this.  Pt is to perform this in order to work on improving her eye strain and reading tolerance while awaiting her OT eval (guidelines given after brief discussion with OT).  H/VVOR to be performed at 125 bpm with a busy background, standing on a pillow.  Pt verbalized understanding and agreement.        Educated pt on rationale for positional testing, findings of positional testing inconsistent with BPPV, rationale for assessment of FGA and outcome of FGA, progression of gaze stability, habituation to bending  -Pt verbalized understanding.         HOME CARDIO PROGRAM  Walk, indoor bike, or swim  5 minute warm up to allow heart rate to gradually increase  20 minute active phase between 135-170bpm (or RPE between 11-15 per chart below)  5 minute cool down to allow heart rate to gradually decrease   3-7 days / week         [facility] [facility] Treadmill Test  Date:  6/10/25    Baseline overall symptom level = 0  60% MHR = 115         70% MHR = 134  80% MHR = 154  90% MHR = 173    Test performed at speed 3.3    Minute Incline HR RPE Symptoms Comments   0 0 112 6 0    1 1% 128 7 0    2 2% 126 8 0    3 3% 128 8 0    4 4% 130 8 0    5 5% 128 9 0    6 6% 131 9 0    7 7% 138 9 0    8 8% 150 9 0    9 9% 147 9 0    10 10% 160 10 0    11 11% 166 10 0    12 12% 168 10 0    13 13% 171 11 0    14 14% 174 11 0    COOL DOWN        15 0%, 2.0mph 173 9 0    16 0%, 2.0mph 151 7 0    17 0%, 2.0mph 149 6 0       Posttest assessment:   Test stopped at: 14 minutes due to pt achieved 90% of age predicted HR max without increase in symptoms  Post test vitals = 118/79; 119bpm; 97%   Overall symptom level = no change       Date: _6/6/25 Baseline Level (0-5): 0  MSQ: 3.9%      Position Change Intensity Adjusted Intensity Duration Score   1.Sitting to Supine 0      2.Supine to Left Side Lying 0      3.Supine to Right Side Lying 0      4.Supine to Sitting 0.5  7s (1)  1.5   5.Left Hallpike 0.5  > 30s (3) 3.5   6.Return to Sitting 1  22s (2) 3   7.Right Hallpike 0      8.Return to Sitting 0.5  8s (1) 1.5   9.Sitting to Nose to Left Knee 0.5  7s (1) 1.5   10.Return to Sitting 0      11.Sitting to Nose to Right Knee 0.5  6s (1)    12.Return to Sitting 0      13.Sitting with Head Rotation (Side-Side 5x) 0      14.Sitting with Head Flex & Ext (Nod 5x) 0.5  16s (2) 2.5   15.Standing with Turn 180* to Right 0      16.Standing with Turn 180* to Left           Total   Score = 13.5      Intensity: Scale from 0 to 5 (0 = No sx; 5 = Severe sx per pt's subjective report)  Adjusted Intensity: Intensity Level - Baseline Level  Duration: Scale from 0 to 3 for return to baseline (5-10 sec = 1, 11-30 sec = 2, >30 sec = 3)  Score: Adjusted Intensity + Duration    Motion Sensitivity Quotient: # Provoking Positions x Total Score x100 =  ________________                                                                               2048                                 *If <16 positions are tested, 2048 is replaced with 8 x (# of positions tested)2   (ex: Tested 12 positions, so 8 x (12)2 = 1152 and new MSQ is (# Provoking Positions x Total Score)/1152    Vince FW, Mauricio SPAIN. Validity and reliability of the Motion Sensitivity Test.    Journal of Rehabilitation Research and Development. 2003;40(5):415-422.        modified Motion Sensitivity Test     Date__6/4/25_    All movements are done in standing, eyes are open, in front of a plain wall  Symptoms  must return to baseline before the next movement is performed  Mayelin records the change of intensity from baseline, then after the movement is completed    Intensity 0-10  Duration <5 s =0  Score = Intensity + Duration    0 = none   5-10 s =1    10 = severe   11-20s =2                                                   21-30s =3                                                   >30 s =4    Baseline Symptoms = 0  MOVEMENT Intensity Duration Score   1. 5x Horizontal head turns 1 13s (2) 3   2. 5x Vertical head turns 1 15s (2) 3   3. 5x Right diagonal head turns  (upper left down to right) 0     4. 5x Left diagonal head turns  (upper right down to left) 0     5. 5x Trunk Bends  (bending knees reaching to floor) 2 21s (3) 5   6. 5x Right quarter body turns  (Look over right shoulder with trunk rotation, feet planted) 0     7. 5x Left quarter body turns (Look over left shoulder with trunk rotation, feet planted) 1 9s (1) 2   8. 1x 360 degree turn to right 0     9. 1x 360 degree turn to left 0     10. 5x VOR cancellation  (follow thumbs horizontally with head/trunk rotation X45 degrees  each way) 2 30s (3) 5   Total score   18   mMSQ = Total score x (# of positions) / 14.00 MSQ = 6.4  0-10 mild range  11-30 moderate   severe
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
## 2025-06-23 ENCOUNTER — HOSPITAL ENCOUNTER (OUTPATIENT)
Dept: PHYSICAL THERAPY | Facility: REHABILITATION | Age: 20
Setting detail: THERAPIES SERIES
Discharge: HOME | End: 2025-06-23
Attending: FAMILY MEDICINE
Payer: COMMERCIAL

## 2025-06-23 DIAGNOSIS — H81.90 NYSTAGMUS, VESTIBULAR: ICD-10-CM

## 2025-06-23 DIAGNOSIS — H55.09 NYSTAGMUS, VESTIBULAR: ICD-10-CM

## 2025-06-23 DIAGNOSIS — S06.0X0A CONCUSSION WITHOUT LOSS OF CONSCIOUSNESS, INITIAL ENCOUNTER: Primary | ICD-10-CM

## 2025-06-23 PROCEDURE — 97112 NEUROMUSCULAR REEDUCATION: CPT | Mod: GP

## 2025-06-23 PROCEDURE — 97530 THERAPEUTIC ACTIVITIES: CPT | Mod: GP

## 2025-06-23 NOTE — OP PT TREATMENT LOG
"P.T. TREATMENT LOG    Treatment Current Session Time    CANALITH  REPOSITIONING TREATMENT  (CPT 39907) Y/N Notes Not performed SYMPTOMS   CRT       NEURO RE-ED  (CPT 52964) Y/N Notes 38-52 Minutes   SYMPTOMS   Oculomotor exam  See vestibular chapter    BPPV ASSESSMENT  See vestibular chapter    VOR CANCELLATION                       Standing H/VVOR-C  Standing facing window:   - HVOR-C x 1 min with dizziness inc to 0.5/10, 1.5/10 eye strain.   - VVOR-C x 1 min with 0.5/10 dizziness, 2/10 eye strain    Ambulation w/ H/VVOR-C  Amb with VOR-C in long hallway with windows:   - HVOR-C x 100 ft with eye strain inc from 1/10 to 1.5/10, no dizziness  - VVOR-C x 100 ft with eye strain inc from 1/10 to 1.5/10 x 100 ft with eye strain inc from 1/10 to 1.5/10    VOR / GAZE STABILITY      Standing H/VVOR   No                     BASELINE: 0/10 eye strain  Standing 4' from 1\" B on blue background:  HVOR @ 105>110 bpm x60s: 0.5/10 dizziness, target clear    - standing rest for symptom resolution    VVOR @ 103 > 110bpm x60s: 0.5/10 dizziness, \"and a little bit of eye strain when my head is down and I'm looking at the B\"    Repeated above with busy background (balloons):  HVOR @ 110 > 120  bpm x60s: no dizziness, balloons were blurry but B was clear.     VVOR @ 110 > 120 bpm x60s: 0/10 dizziness, 1.5/10 eye strain     Ambulation w/H/VVOR Yes          n Walking backward/forward with 180* turns between targets on patterned backgrounds:   - HVOR at 125 bpm x 1 min, 1/10 dizziness.    - VVOR at 125 bpm x 1 min, 0.5/10 dizziness    Walking backward/forward 4 steps, focusing on 1\" B on red with white dot background:   - HVOR at 120 bpm x 1 min with eye strain inc from 1/10 to 1.5/10.  No inc dizziness.   - VVOR at 120 bpm x 1 min with eye strain inc from 1/10 to 1.5/10, no dizziness.      H/VVOR on compliant surfaces yes On airex foam, 4' from 1\" B on black/white berry background:  -HVOR @ 130 inc to 135 bpm x60s.  No inc sxs.    -VVOR @ " "130 inc to 135 bpm x60s: eye strain inc from 0 o 0.5/10, mainly when head nods down and eyes up.       H/VVOR on sway surface  On a/p rocker, 4' from 1\" B on apple background:   - HVOR at 120 bpm x 1 min with report of mild 1/10 eye strain above L eye.  Reports no dizziness, but mild imbalance feeling.    - VVOR at 120 bpm x 1 min.  Reports 1.5/10 eye strain, but no dizziness.      H/VVOR w/ linear movement      Functional VOR      HABITUATION      Bending n                      N Blazepods:  - All 6 pods on floor in a small arc in front of pt, no light delay, tap yellow (not blue) when it lights up.  Performed for 1 min with inc dizziness 1.5-2/10.    - All pods on floor in small arc in front of pt, 1s delay, tap blue pod when it lights up (rest not lit).  Performed for 1 min.  24 hits.  Within the last 10 seconds, felt like dizziness started at 1/10.      1) 3 on lowered mat, 3 on floor.  Light delay random between 2-4s.  X2 min. No significant dizziness  2) All pods on floor in small arc in front of pt, light delay to 0.5-2s.  X2 min.  1/10 dizziness @ 1:15s left, paused to allow rest.  1/10 dizziness at @ 33s left, paused to allow rest.  At end, almost 1/10 dizziness.  Seated rest before proceeding to 3.  3) repeated 2. 1/10 dizziness @ 1:20s left, 1/1 dizziness at 33s left, 1/10 dizziness at end.    Rest breaks throughout for symptom management.     Turning                Tennis ball catch from one side and place in basket on other side:   - catch from L, place in basket on R x 20 balls with 2/10 dizziness and 2/10 eye strain.    - catch from R, place in basket on L x 20 balls.  1/10 dizziness and 1/10 L eye strain.     Tennis ball catch from in front, then turn to place in basket behind pt, alternating sides x 20 balls. 0.5/10 dizziness.  1/10 eye strain.     Bending/turning combined Yes            n Retrieve ball from bounce/catch/toss and turn to reach overhead to place ball in basket on high cabinet, with " "patterned tablecloth on the floor. Minimal to no inc dizziness reported with reps.     Bend to touch one of 6 blaze pods, turn around to retrieve Squigg from window about 10 ft away, turn and bend to place in bucket, then repeat x 2 min with cog multitasking:   -1st rep: 2/10 dizziness, no inc eye strain.   - 2nd rep: 0/10 dizziness, no inc eye strain    Figure 8s      Repetitive functional movements      BALANCE- STATIC      On floor      Airex foam      Rockerboard      LOS TRAINING      Weight shifting w/ ankle strategy      Reaching w/ ankle strategy      Biodex training      SLS STABILITY      Alternating toe taps      Slow marching      High knee walking      Cone tapping       BALANCE- DYNAMIC      Ambulation-head turns/nods      Ambulation - 180 & 360 degree turns      Retro ambulation EO      Ambulation with EC      Obstacles      Tandem      REACTIVE BALANCE      Perturbation on rocker/airex      Ball tap on airex      Airex/rocker step ups      Rebounder ball toss on rocker      Ball tap while walking      STS w/ TB around hips      Backward step to bounce off large therapy ball > fwd step      Lateral step w/ TB around hips      Backward step w/ TB around hips      LE COORDINATION      Floor ladder      4 squares      MULTITASKING      Reading task w/ gait activity      Cognitive task w/ gait activity      Negotiating obstacles while carrying ball on cone      OPTOKINETIC STIM      Bertec training yes Visual flow [employer]: slow speed, med difficulty, movement gain 0.5-2, testing time 1 min, shelf width normal, shelf density med, floor tiled.  3 trials performed with pt reporting feeling slightly symptomatic, but not \"dizzy\" and not \"eye strain\".            OBJECTIVE MEASURES      modMSQ  6/4 (FFU): 6.4 with 5 positions, mild    MSQ  6/6/25: 3.9% with 6 positions    FGA  6/12/25: 30/30 (WNL)    SOT  6/6/25: all WNL    DVA      Gait Speed      BCTT  6/10: passed    THER-EX   (CPT 45552) Y/N SETS REPS " LOAD Not performed SYMPTOMS   STRETCHING                           STRENGTHENING         LE STRENGTHENING                                                      CARDIOVASCULAR      NuStep      Recumbent bike      Treadmill      THER ACT  (CPT 14181) Y/N  8-22 Minutes   SYMPTOMS   Review pain, meds, falls, vitals, symptoms yes Reviewed with patient    *Subjective Measures       DHI  IE: 44                       ABC  IE: 84.3    HEP  - CV activity (see below)   - VOR x1 @ 120bpm x60s ea on busy background, standing on cushion, 3-4x/daily (reviewed maintaining symptoms 2 points from baseline, reducing speed if symptoms are increasing past 2 point threshold or if symptoms lasting > 20 minutes).  - VOR-C @ SSV x30s ea, 3x/day    Therapeutic Rest yes Throughout session for symptom management    Patient Education  6/18/25: Educated pt regarding HEP - inc VOR speeds and continue cardio. Pt verbalized understanding and agreement.     6/16/25: Educated pt to work on her reading tolerance, trying to read on paper for 10 min, then rest for 5 min, then read for another 10 min.  Educated pt that if she is not able to tolerate this much readying, then to work up to this.  Pt is to perform this in order to work on improving her eye strain and reading tolerance while awaiting her OT eval (guidelines given after brief discussion with OT).  H/VVOR to be performed at 125 bpm with a busy background, standing on a pillow.  Pt verbalized understanding and agreement.        Educated pt on rationale for positional testing, findings of positional testing inconsistent with BPPV, rationale for assessment of FGA and outcome of FGA, progression of gaze stability, habituation to bending  -Pt verbalized understanding.         HOME CARDIO PROGRAM  Walk, indoor bike, or swim  5 minute warm up to allow heart rate to gradually increase  20 minute active phase between 135-170bpm (or RPE between 11-15 per chart below)  5 minute cool down to allow heart  rate to gradually decrease   3-7 days / week         [facility] Concussion Treadmill Test  Date:  6/10/25    Baseline overall symptom level = 0  60% MHR = 115         70% MHR = 134  80% MHR = 154  90% MHR = 173    Test performed at speed 3.3    Minute Incline HR RPE Symptoms Comments   0 0 112 6 0    1 1% 128 7 0    2 2% 126 8 0    3 3% 128 8 0    4 4% 130 8 0    5 5% 128 9 0    6 6% 131 9 0    7 7% 138 9 0    8 8% 150 9 0    9 9% 147 9 0    10 10% 160 10 0    11 11% 166 10 0    12 12% 168 10 0    13 13% 171 11 0    14 14% 174 11 0    COOL DOWN        15 0%, 2.0mph 173 9 0    16 0%, 2.0mph 151 7 0    17 0%, 2.0mph 149 6 0      Posttest assessment:   Test stopped at: 14 minutes due to pt achieved 90% of age predicted HR max without increase in symptoms  Post test vitals = 118/79; 119bpm; 97%   Overall symptom level = no change       Date: _6/6/25 Baseline Level (0-5): 0  MSQ: 3.9%      Position Change Intensity Adjusted Intensity Duration Score   1.Sitting to Supine 0      2.Supine to Left Side Lying 0      3.Supine to Right Side Lying 0      4.Supine to Sitting 0.5  7s (1)  1.5   5.Left Hallpike 0.5  > 30s (3) 3.5   6.Return to Sitting 1  22s (2) 3   7.Right Hallpike 0      8.Return to Sitting 0.5  8s (1) 1.5   9.Sitting to Nose to Left Knee 0.5  7s (1) 1.5   10.Return to Sitting 0      11.Sitting to Nose to Right Knee 0.5  6s (1)    12.Return to Sitting 0      13.Sitting with Head Rotation (Side-Side 5x) 0      14.Sitting with Head Flex & Ext (Nod 5x) 0.5  16s (2) 2.5   15.Standing with Turn 180* to Right 0      16.Standing with Turn 180* to Left           Total   Score = 13.5      Intensity: Scale from 0 to 5 (0 = No sx; 5 = Severe sx per pt's subjective report)  Adjusted Intensity: Intensity Level - Baseline Level  Duration: Scale from 0 to 3 for return to baseline (5-10 sec = 1, 11-30 sec = 2, >30 sec = 3)  Score: Adjusted Intensity + Duration    Motion Sensitivity Quotient: # Provoking Positions x Total Score x100  =  ________________                                                                               2048                                 *If <16 positions are tested, 2048 is replaced with 8 x (# of positions tested)2   (ex: Tested 12 positions, so 8 x (12)2 = 1152 and new MSQ is (# Provoking Positions x Total Score)/1152    Vince FW, Mauricio SPAIN. Validity and reliability of the Motion Sensitivity Test.    Journal of Rehabilitation Research and Development. 2003;40(5):415-422.        modified Motion Sensitivity Test     Date__6/4/25_    All movements are done in standing, eyes are open, in front of a plain wall  Symptoms must return to baseline before the next movement is performed  Mayelin records the change of intensity from baseline, then after the movement is completed    Intensity 0-10  Duration <5 s =0  Score = Intensity + Duration    0 = none   5-10 s =1    10 = severe   11-20s =2                                                   21-30s =3                                                   >30 s =4    Baseline Symptoms = 0  MOVEMENT Intensity Duration Score   1. 5x Horizontal head turns 1 13s (2) 3   2. 5x Vertical head turns 1 15s (2) 3   3. 5x Right diagonal head turns  (upper left down to right) 0     4. 5x Left diagonal head turns  (upper right down to left) 0     5. 5x Trunk Bends  (bending knees reaching to floor) 2 21s (3) 5   6. 5x Right quarter body turns  (Look over right shoulder with trunk rotation, feet planted) 0     7. 5x Left quarter body turns (Look over left shoulder with trunk rotation, feet planted) 1 9s (1) 2   8. 1x 360 degree turn to right 0     9. 1x 360 degree turn to left 0     10. 5x VOR cancellation  (follow thumbs horizontally with head/trunk rotation X45 degrees  each way) 2 30s (3) 5   Total score   18   mMSQ = Total score x (# of positions) / 14.00 MSQ = 6.4  0-10 mild range  11-30 moderate   severe
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
## 2025-06-25 ENCOUNTER — HOSPITAL ENCOUNTER (OUTPATIENT)
Dept: PHYSICAL THERAPY | Facility: REHABILITATION | Age: 20
Setting detail: THERAPIES SERIES
Discharge: HOME | End: 2025-06-25
Attending: FAMILY MEDICINE
Payer: COMMERCIAL

## 2025-06-25 DIAGNOSIS — S06.0X0A CONCUSSION WITHOUT LOSS OF CONSCIOUSNESS, INITIAL ENCOUNTER: Primary | ICD-10-CM

## 2025-06-25 DIAGNOSIS — H81.90 NYSTAGMUS, VESTIBULAR: ICD-10-CM

## 2025-06-25 DIAGNOSIS — H55.09 NYSTAGMUS, VESTIBULAR: ICD-10-CM

## 2025-06-25 PROCEDURE — 97530 THERAPEUTIC ACTIVITIES: CPT | Mod: GP

## 2025-06-25 PROCEDURE — 97112 NEUROMUSCULAR REEDUCATION: CPT | Mod: GP

## 2025-06-25 NOTE — OP PT TREATMENT LOG
"P.T. TREATMENT LOG    Treatment Current Session Time    CANALITH  REPOSITIONING TREATMENT  (CPT 46179) Y/N Notes Not performed SYMPTOMS   CRT       NEURO RE-ED  (CPT 14317) Y/N Notes 38-52 Minutes   SYMPTOMS   Oculomotor exam  See vestibular chapter    BPPV ASSESSMENT  See vestibular chapter    VOR CANCELLATION                       Standing H/VVOR-C  Standing facing window:   - HVOR-C x 1 min with dizziness inc to 0.5/10, 1.5/10 eye strain.   - VVOR-C x 1 min with 0.5/10 dizziness, 2/10 eye strain    Ambulation w/ H/VVOR-C  Amb with VOR-C in long hallway with windows:   - HVOR-C x 100 ft with eye strain inc from 1/10 to 1.5/10, no dizziness  - VVOR-C x 100 ft with eye strain inc from 1/10 to 1.5/10 x 100 ft with eye strain inc from 1/10 to 1.5/10    VOR / GAZE STABILITY      Standing H/VVOR   No                     BASELINE: 0/10 eye strain  Standing 4' from 1\" B on blue background:  HVOR @ 105>110 bpm x60s: 0.5/10 dizziness, target clear    - standing rest for symptom resolution    VVOR @ 103 > 110bpm x60s: 0.5/10 dizziness, \"and a little bit of eye strain when my head is down and I'm looking at the B\"    Repeated above with busy background (balloons):  HVOR @ 110 > 120  bpm x60s: no dizziness, balloons were blurry but B was clear.     VVOR @ 110 > 120 bpm x60s: 0/10 dizziness, 1.5/10 eye strain     Ambulation w/H/VVOR           n Walking backward/forward with 180* turns between targets on patterned backgrounds:   - HVOR at 125 bpm x 1 min, 1/10 dizziness.    - VVOR at 125 bpm x 1 min, 0.5/10 dizziness    Walking backward/forward 4 steps, focusing on 1\" B on red with white dot background:   - HVOR at 120 bpm x 1 min with eye strain inc from 1/10 to 1.5/10.  No inc dizziness.   - VVOR at 120 bpm x 1 min with eye strain inc from 1/10 to 1.5/10, no dizziness.      H/VVOR on compliant surfaces  On airex foam, 4' from 1\" B on black/white berry background:  -HVOR @ 130 inc to 135 bpm x60s.  No inc sxs.    -VVOR @ 130 " "inc to 135 bpm x60s: eye strain inc from 0 o 0.5/10, mainly when head nods down and eyes up.       H/VVOR on sway surface  On a/p rocker, 4' from 1\" B on apple background:   - HVOR at 120 bpm x 1 min with report of mild 1/10 eye strain above L eye.  Reports no dizziness, but mild imbalance feeling.    - VVOR at 120 bpm x 1 min.  Reports 1.5/10 eye strain, but no dizziness.      H/VVOR w/ linear movement      Functional VOR      HABITUATION      Bending yes                      N Lift/chop focusing on patterned colorful ball, back to wall:   - down left to up right x 5 with 1/10 dizziness when looking up right, but resolved immediately when stopped.    - down right to up left x 5 without c/o.   - down left to up right x 5 at faster pace, 1/10 coming up to R.   - down R to up L x 5 at faster pace, no c/o.        Blazepods:  - All 6 pods on floor in a small arc in front of pt, no light delay, tap yellow (not blue) when it lights up.  Performed for 1 min with inc dizziness 1.5-2/10.    - All pods on floor in small arc in front of pt, 1s delay, tap blue pod when it lights up (rest not lit).  Performed for 1 min.  24 hits.  Within the last 10 seconds, felt like dizziness started at 1/10.      1) 3 on lowered mat, 3 on floor.  Light delay random between 2-4s.  X2 min. No significant dizziness  2) All pods on floor in small arc in front of pt, light delay to 0.5-2s.  X2 min.  1/10 dizziness @ 1:15s left, paused to allow rest.  1/10 dizziness at @ 33s left, paused to allow rest.  At end, almost 1/10 dizziness.  Seated rest before proceeding to 3.  3) repeated 2. 1/10 dizziness @ 1:20s left, 1/1 dizziness at 33s left, 1/10 dizziness at end.    Rest breaks throughout for symptom management.     Turning                Tennis ball catch from one side and place in basket on other side:   - catch from L, place in basket on R x 20 balls with 2/10 dizziness and 2/10 eye strain.    - catch from R, place in basket on L x 20 balls.  " 1/10 dizziness and 1/10 L eye strain.     Tennis ball catch from in front, then turn to place in basket behind pt, alternating sides x 20 balls. 0.5/10 dizziness.  1/10 eye strain.     Bending/turning combined Yes            n Walk full [place] around cone, eyes focused on the cone, bend to  the cone and follow it with eyes as arc overhead to hand to PT behind pt.  Repeated for 6 cones placed ~3 ft apart.  2 reps with 1/10 dizziness after each.      Retrieve ball from bounce/catch/toss and turn to reach overhead to place ball in basket on high cabinet, with patterned tablecloth on the floor. Minimal to no inc dizziness reported with reps.     Bend to touch one of 6 blaze pods, turn around to retrieve [place] from window about 10 ft away, turn and bend to place in bucket, then repeat x 2 min with cog multitasking:   -1st rep: 2/10 dizziness, no inc eye strain.   - 2nd rep: 0/10 dizziness, no inc eye strain    Figure 8s      Repetitive functional movements      BALANCE- STATIC      On floor      Airex foam      Rockerboard      LOS TRAINING      Weight shifting w/ ankle strategy      Reaching w/ ankle strategy      Biodex training      SLS STABILITY      Alternating toe taps      Slow marching      High knee walking      Cone tapping       BALANCE- DYNAMIC      Ambulation-head turns/nods      Ambulation - 180 & 360 degree turns      Retro ambulation EO      Ambulation with EC      Obstacles      Tandem      REACTIVE BALANCE      Perturbation on rocker/airex      Ball tap on airex      Airex/rocker step ups      Rebounder ball toss on rocker      Ball tap while walking      STS w/ TB around hips      Backward step to bounce off large therapy ball > fwd step      Lateral step w/ TB around hips      Backward step w/ TB around hips      LE COORDINATION      Floor ladder      4 squares      MULTITASKING      Reading task w/ gait activity      Cognitive task w/ gait activity      Negotiating obstacles while carrying  "ball on cone      OPTOKINETIC STIM      [employer] training  Visual flow [employer]: slow speed, med difficulty, movement gain 0.5-2, testing time 1 min, shelf width normal, shelf density med, floor tiled.  3 trials performed with pt reporting feeling slightly symptomatic, but not \"dizzy\" and not \"eye strain\".            OBJECTIVE MEASURES      modMSQ  6/4 (FFU): 6.4 with 5 positions, mild    MSQ  6/6/25: 3.9% with 6 positions    FGA  6/12/25: 30/30 (WNL)    SOT  6/6/25: all WNL    DVA      GST yes Completed (performed by Shahla Hernandez PT with Yasmine Merrill PT supervising)    Gait Speed      BCTT  6/10: passed    THER-EX   (CPT 22910) Y/N SETS REPS LOAD Not performed SYMPTOMS   STRETCHING                           STRENGTHENING         LE STRENGTHENING                                                      CARDIOVASCULAR      NuStep      Recumbent bike      Treadmill      THER ACT  (CPT 02075) Y/N  8-22 Minutes   SYMPTOMS   Review pain, meds, falls, vitals, symptoms yes Reviewed with patient    *Subjective Measures       DHI  IE: 44                       ABC  IE: 84.3    HEP  - CV activity (see below)   - VOR x1 @ 120bpm x60s ea on busy background, standing on cushion, 3-4x/daily (reviewed maintaining symptoms 2 points from baseline, reducing speed if symptoms are increasing past 2 point threshold or if symptoms lasting > 20 minutes).  - VOR-C @ SSV x30s ea, 3x/day    Therapeutic Rest yes Throughout session for symptom management    Patient Education yes 6/25/25: Educated pt regarding the results of GST testing today and to continue working on her H/VVOR HEP.  Discussed the possibility of assessing vertical DVA with her progress note next visit. Pt verbalized understanding and agreement.     6/18/25: Educated pt regarding HEP - inc VOR speeds and continue cardio. Pt verbalized understanding and agreement.     6/16/25: Educated pt to work on her reading tolerance, trying to read on paper for 10 min, then rest for 5 min, " then read for another 10 min.  Educated pt that if she is not able to tolerate this much readying, then to work up to this.  Pt is to perform this in order to work on improving her eye strain and reading tolerance while awaiting her OT eval (guidelines given after brief discussion with OT).  H/VVOR to be performed at 125 bpm with a busy background, standing on a pillow.  Pt verbalized understanding and agreement.        Educated pt on rationale for positional testing, findings of positional testing inconsistent with BPPV, rationale for assessment of FGA and outcome of FGA, progression of gaze stability, habituation to bending  -Pt verbalized understanding.         HOME CARDIO PROGRAM  Walk, indoor bike, or swim  5 minute warm up to allow heart rate to gradually increase  20 minute active phase between 135-170bpm (or RPE between 11-15 per chart below)  5 minute cool down to allow heart rate to gradually decrease   3-7 days / week         [facility] Concussion Treadmill Test  Date:  6/10/25    Baseline overall symptom level = 0  60% MHR = 115         70% MHR = 134  80% MHR = 154  90% MHR = 173    Test performed at speed 3.3    Minute Incline HR RPE Symptoms Comments   0 0 112 6 0    1 1% 128 7 0    2 2% 126 8 0    3 3% 128 8 0    4 4% 130 8 0    5 5% 128 9 0    6 6% 131 9 0    7 7% 138 9 0    8 8% 150 9 0    9 9% 147 9 0    10 10% 160 10 0    11 11% 166 10 0    12 12% 168 10 0    13 13% 171 11 0    14 14% 174 11 0    COOL DOWN        15 0%, 2.0mph 173 9 0    16 0%, 2.0mph 151 7 0    17 0%, 2.0mph 149 6 0      Posttest assessment:   Test stopped at: 14 minutes due to pt achieved 90% of age predicted HR max without increase in symptoms  Post test vitals = 118/79; 119bpm; 97%   Overall symptom level = no change       Date: _6/6/25 Baseline Level (0-5): 0  MSQ: 3.9%      Position Change Intensity Adjusted Intensity Duration Score   1.Sitting to Supine 0      2.Supine to Left Side Lying 0      3.Supine to Right Side Lying 0       4.Supine to Sitting 0.5  7s (1)  1.5   5.Left Hallpike 0.5  > 30s (3) 3.5   6.Return to Sitting 1  22s (2) 3   7.Right Hallpike 0      8.Return to Sitting 0.5  8s (1) 1.5   9.Sitting to Nose to Left Knee 0.5  7s (1) 1.5   10.Return to Sitting 0      11.Sitting to Nose to Right Knee 0.5  6s (1)    12.Return to Sitting 0      13.Sitting with Head Rotation (Side-Side 5x) 0      14.Sitting with Head Flex & Ext (Nod 5x) 0.5  16s (2) 2.5   15.Standing with Turn 180* to Right 0      16.Standing with Turn 180* to Left           Total   Score = 13.5      Intensity: Scale from 0 to 5 (0 = No sx; 5 = Severe sx per pt's subjective report)  Adjusted Intensity: Intensity Level - Baseline Level  Duration: Scale from 0 to 3 for return to baseline (5-10 sec = 1, 11-30 sec = 2, >30 sec = 3)  Score: Adjusted Intensity + Duration    Motion Sensitivity Quotient: # Provoking Positions x Total Score x100 =  ________________                                                                               2048                                 *If <16 positions are tested, 2048 is replaced with 8 x (# of positions tested)2   (ex: Tested 12 positions, so 8 x (12)2 = 1152 and new MSQ is (# Provoking Positions x Total Score)/1152    Vince FW, Mauricio MJ. Validity and reliability of the Motion Sensitivity Test.    Journal of Rehabilitation Research and Development. 2003;40(5):415-422.        modified Motion Sensitivity Test     Date__6/4/25_    All movements are done in standing, eyes are open, in front of a plain wall  Symptoms must return to baseline before the next movement is performed  Mayelin records the change of intensity from baseline, then after the movement is completed    Intensity 0-10  Duration <5 s =0  Score = Intensity + Duration    0 = none   5-10 s =1    10 = severe   11-20s =2                                                   21-30s =3                                                   >30 s =4    Baseline Symptoms = 0  MOVEMENT  Intensity Duration Score   1. 5x Horizontal head turns 1 13s (2) 3   2. 5x Vertical head turns 1 15s (2) 3   3. 5x Right diagonal head turns  (upper left down to right) 0     4. 5x Left diagonal head turns  (upper right down to left) 0     5. 5x Trunk Bends  (bending knees reaching to floor) 2 21s (3) 5   6. 5x Right quarter body turns  (Look over right shoulder with trunk rotation, feet planted) 0     7. 5x Left quarter body turns (Look over left shoulder with trunk rotation, feet planted) 1 9s (1) 2   8. 1x 360 degree turn to right 0     9. 1x 360 degree turn to left 0     10. 5x VOR cancellation  (follow thumbs horizontally with head/trunk rotation X45 degrees  each way) 2 30s (3) 5   Total score   18   mMSQ = Total score x (# of positions) / 14.00 MSQ = 6.4  0-10 mild range  11-30 moderate   severe
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
## 2025-06-30 ENCOUNTER — HOSPITAL ENCOUNTER (OUTPATIENT)
Dept: PHYSICAL THERAPY | Facility: REHABILITATION | Age: 20
Setting detail: THERAPIES SERIES
Discharge: HOME | End: 2025-06-30
Attending: FAMILY MEDICINE
Payer: COMMERCIAL

## 2025-06-30 DIAGNOSIS — H81.90 NYSTAGMUS, VESTIBULAR: ICD-10-CM

## 2025-06-30 DIAGNOSIS — S06.0X0A CONCUSSION WITHOUT LOSS OF CONSCIOUSNESS, INITIAL ENCOUNTER: Primary | ICD-10-CM

## 2025-06-30 DIAGNOSIS — H55.09 NYSTAGMUS, VESTIBULAR: ICD-10-CM

## 2025-06-30 PROCEDURE — 97112 NEUROMUSCULAR REEDUCATION: CPT | Mod: GP

## 2025-06-30 PROCEDURE — 97530 THERAPEUTIC ACTIVITIES: CPT | Mod: GP

## 2025-06-30 NOTE — PROGRESS NOTES
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 (1)    12.Return to Sitting 0      13.Sitting with Head Rotation (Side-Side 5x) 0      14.Sitting with Head Flex & Ext (Nod 5x) 0.5  16s (2) 2.5   15.Standing with Turn 180* to Right 0      16.Standing with Turn 180* to Left           Total   Score = 13.5      Intensity: Scale from 0 to 5 (0 = No sx; 5 = Severe sx per pt's subjective report)  Adjusted Intensity: Intensity Level - Baseline Level  Duration: Scale from 0 to 3 for return to baseline (5-10 sec = 1, 11-30 sec = 2, >30 sec = 3)  Score: Adjusted Intensity + Duration    Motion Sensitivity Quotient: # Provoking Positions x Total Score x100 =  ________________                                                                               2048                                 *If <16 positions are tested, 2048 is replaced with 8 x (# of positions tested)2   (ex: Tested 12 positions, so 8 x (12)2 = 1152 and new MSQ is (# Provoking Positions x Total Score)/1152    Vince FW, Mauricio MJ. Validity and reliability of the Motion Sensitivity Test.    Journal of Rehabilitation Research and Development. 2003;40(5):415-422.        modified Motion Sensitivity Test     Date__6/4/25_    All movements are done in standing, eyes are open, in front of a plain wall  Symptoms must return to baseline before the next movement is performed  Mayelin records the change of intensity from baseline, then  after the movement is completed    Intensity 0-10  Duration <5 s =0  Score = Intensity + Duration    0 = none   5-10 s =1    10 = severe   11-20s =2                                                   21-30s =3                                                   >30 s =4    Baseline Symptoms = 0  MOVEMENT Intensity Duration Score   1. 5x Horizontal head turns 1 13s (2) 3   2. 5x Vertical head turns 1 15s (2) 3   3. 5x Right diagonal head turns  (upper left down to right) 0     4. 5x Left diagonal head turns  (upper right down to left) 0     5. 5x Trunk Bends  (bending knees reaching to floor) 2 21s (3) 5   6. 5x Right quarter body turns  (Look over right shoulder with trunk rotation, feet planted) 0     7. 5x Left quarter body turns (Look over left shoulder with trunk rotation, feet planted) 1 9s (1) 2   8. 1x 360 degree turn to right 0     9. 1x 360 degree turn to left 0     10. 5x VOR cancellation  (follow thumbs horizontally with head/trunk rotation X45 degrees  each way) 2 30s (3) 5   Total score   18   mMSQ = Total score x (# of positions) / 14.00 MSQ = 6.4  0-10 mild range  11-30 moderate   severe       Goals Addressed                   This Visit's Progress     [facility] PT Vestibular Goals          Short Term Goals Time Frame Result Comment/Progress   Patient will be assessed for motion sensitivity via MSQ.  (2% or less = WNL)    4-6 weeks met    Pt will be assessed for balance integration via SOT.  (score < 38 increases likelihood ratio of falls; MDC 8 points) 4-6 weeks met    Patient will be assessed for gait and balance stability via FGA.  (fall risk </=22/30; MDC 6 points for vestibular diagnoses)   4-6 weeks met    Patient will be assessed for dynamic visual acuity via DVA.  (Norm= 2 lines or less) 4-6 weeks met    Patient will perform home exercise program with supervision.   4-6 weeks met    Patient will tolerate 15 minutes of cardiovascular conditioning at (30-40% max HR / 40-60% max HR / 60-75% max HR /  % max HR).   4-6 weeks met    Patient will demonstrate ability to manage symptoms with <20% cues. 4-6 weeks met    Pt will be assessed for BPPV. 4-6 weeks met    Patient will improve score on ABC by 5% for decreased report of imbalance with daily activities.   (< 67% indicates fall risk in vestibular disorders) 4-6 weeks met    Patient will improve score on DHI by 9 pts for decreased report of dizziness with daily activities.  (18 points= MCID) 4-6 weeks met          Long Term Goals Time Frame Result Comment/Progress   Patient will decrease Motion Sensitivity Quotient to 2 % for increased functional tolerance.   (2% or less =WNL)   8-12 weeks met    Patient will increase Balance Master SOT composite score to WNL for increased postural control.  (score < 38 increases likelihood ratio of falls; MDC 8 points) 8-12 weeks met    Patient will increase FGA score to >/= 29/30 for increased gait stability and balance.    (fall risk </=22/30; MDC 6 points for vestibular diagnoses) 8-12 weeks met    Patient will decrease DVA to 2 lines for functional improved gaze stability. 8-12 weeks met    Patient will perform home exercise program independently.   8-12 weeks met    Patient will tolerate 30 minutes of cardiovascular conditioning at (30-40% max HR / 40-60% max HR / 60-75% max HR / % max HR).   8-12 weeks met    Patient will demonstrate independence with managing any residual symptoms. 8-12 weeks met    Patient will improve score on ABC to > 80% for decreased report of imbalance with daily activities.    (>/= 80% indicative of increased function) 8-12 weeks met    Patient will demonstrate ability to participate in physical activity at 90% of age predicted max HR without symptoms via ability to pass BCTT. 8-12 weeks met    Patient will improve score on DHI by 18 pts from IE for decreased report of dizziness with daily activities.  (18 points= MCID) 8-12 weeks met    Patient will have no increased symptoms with  challenging VOR activities for symptom free high-level activities.   8-12 weeks Ongoing, progressing                    Beatrice Viveros, PT

## 2025-06-30 NOTE — OP PT TREATMENT LOG
"P.T. TREATMENT LOG    Treatment Current Session Time    CANALITH  REPOSITIONING TREATMENT  (CPT 78827) Y/N Notes Not performed SYMPTOMS   CRT       NEURO RE-ED  (CPT 85419) Y/N Notes 23-37 Minutes   SYMPTOMS   Oculomotor exam  See vestibular chapter    BPPV ASSESSMENT  See vestibular chapter    VOR CANCELLATION                       Standing H/VVOR-C  Standing facing window:   - HVOR-C x 1 min with dizziness inc to 0.5/10, 1.5/10 eye strain.   - VVOR-C x 1 min with 0.5/10 dizziness, 2/10 eye strain    Ambulation w/ H/VVOR-C  Amb with VOR-C in long hallway with windows:   - HVOR-C x 100 ft with eye strain inc from 1/10 to 1.5/10, no dizziness  - VVOR-C x 100 ft with eye strain inc from 1/10 to 1.5/10 x 100 ft with eye strain inc from 1/10 to 1.5/10    VOR / GAZE STABILITY      Standing H/VVOR   No                     BASELINE: 0/10 eye strain  Standing 4' from 1\" B on blue background:  HVOR @ 105>110 bpm x60s: 0.5/10 dizziness, target clear    - standing rest for symptom resolution    VVOR @ 103 > 110bpm x60s: 0.5/10 dizziness, \"and a little bit of eye strain when my head is down and I'm looking at the B\"    Repeated above with busy background (balloons):  HVOR @ 110 > 120  bpm x60s: no dizziness, balloons were blurry but B was clear.     VVOR @ 110 > 120 bpm x60s: 0/10 dizziness, 1.5/10 eye strain     Ambulation w/H/VVOR           n Walking backward/forward with 180* turns between targets on patterned backgrounds:   - HVOR at 125 bpm x 1 min, 1/10 dizziness.    - VVOR at 125 bpm x 1 min, 0.5/10 dizziness    Walking backward/forward 4 steps, focusing on 1\" B on red with white dot background:   - HVOR at 120 bpm x 1 min with eye strain inc from 1/10 to 1.5/10.  No inc dizziness.   - VVOR at 120 bpm x 1 min with eye strain inc from 1/10 to 1.5/10, no dizziness.      H/VVOR on compliant surfaces  On airex foam, 4' from 1\" B on black/white berry background:  -HVOR @ 130 inc to 135 bpm x60s.  No inc sxs.    -VVOR @ 130 " "inc to 135 bpm x60s: eye strain inc from 0 o 0.5/10, mainly when head nods down and eyes up.       H/VVOR on sway surface  On a/p rocker, 4' from 1\" B on apple background:   - HVOR at 120 bpm x 1 min with report of mild 1/10 eye strain above L eye.  Reports no dizziness, but mild imbalance feeling.    - VVOR at 120 bpm x 1 min.  Reports 1.5/10 eye strain, but no dizziness.      Bertec R/L VMS Training yes Brief, with varying optotypes and speeds and backgrounds.    H/VVOR w/ linear movement      Functional VOR      HABITUATION      Bending                      Lift/chop focusing on patterned colorful ball, back to wall:   - down left to up right x 5 with 1/10 dizziness when looking up right, but resolved immediately when stopped.    - down right to up left x 5 without c/o.   - down left to up right x 5 at faster pace, 1/10 coming up to R.   - down R to up L x 5 at faster pace, no c/o.        Blazepods:  - All 6 pods on floor in a small arc in front of pt, no light delay, tap yellow (not blue) when it lights up.  Performed for 1 min with inc dizziness 1.5-2/10.    - All pods on floor in small arc in front of pt, 1s delay, tap blue pod when it lights up (rest not lit).  Performed for 1 min.  24 hits.  Within the last 10 seconds, felt like dizziness started at 1/10.      1) 3 on lowered mat, 3 on floor.  Light delay random between 2-4s.  X2 min. No significant dizziness  2) All pods on floor in small arc in front of pt, light delay to 0.5-2s.  X2 min.  1/10 dizziness @ 1:15s left, paused to allow rest.  1/10 dizziness at @ 33s left, paused to allow rest.  At end, almost 1/10 dizziness.  Seated rest before proceeding to 3.  3) repeated 2. 1/10 dizziness @ 1:20s left, 1/1 dizziness at 33s left, 1/10 dizziness at end.    Rest breaks throughout for symptom management.     Turning                Tennis ball catch from one side and place in basket on other side:   - catch from L, place in basket on R x 20 balls with 2/10 " dizziness and 2/10 eye strain.    - catch from R, place in basket on L x 20 balls.  1/10 dizziness and 1/10 L eye strain.     Tennis ball catch from in front, then turn to place in basket behind pt, alternating sides x 20 balls. 0.5/10 dizziness.  1/10 eye strain.     Bending/turning combined  Walk full [place] around cone, eyes focused on the cone, bend to  the cone and follow it with eyes as arc overhead to hand to PT behind pt.  Repeated for 6 cones placed ~3 ft apart.  2 reps with 1/10 dizziness after each.      Retrieve ball from bounce/catch/toss and turn to reach overhead to place ball in basket on high cabinet, with patterned tablecloth on the floor. Minimal to no inc dizziness reported with reps.     Bend to touch one of 6 blaze pods, turn around to retrieve Squigg from window about 10 ft away, turn and bend to place in bucket, then repeat x 2 min with cog multitasking:   -1st rep: 2/10 dizziness, no inc eye strain.   - 2nd rep: 0/10 dizziness, no inc eye strain    Figure 8s      Repetitive functional movements      BALANCE- STATIC      On floor      Airex foam      Rockerboard      LOS TRAINING      Weight shifting w/ ankle strategy      Reaching w/ ankle strategy      Biodex training      SLS STABILITY      Alternating toe taps      Slow marching      High knee walking      Cone tapping       BALANCE- DYNAMIC      Ambulation-head turns/nods      Ambulation - 180 & 360 degree turns      Retro ambulation EO      Ambulation with EC      Obstacles      Tandem      REACTIVE BALANCE      Perturbation on rocker/airex      Ball tap on airex      Airex/rocker step ups      Rebounder ball toss on rocker      Ball tap while walking      STS w/ TB around hips      Backward step to bounce off large therapy ball > fwd step      Lateral step w/ TB around hips      Backward step w/ TB around hips      LE COORDINATION      Floor ladder      4 squares      MULTITASKING      Reading task w/ gait activity     "  Cognitive task w/ gait activity      Negotiating obstacles while carrying ball on cone      OPTOKINETIC STIM      Bertec training  Visual flow [employer]: slow speed, med difficulty, movement gain 0.5-2, testing time 1 min, shelf width normal, shelf density med, floor tiled.  3 trials performed with pt reporting feeling slightly symptomatic, but not \"dizzy\" and not \"eye strain\".            OBJECTIVE MEASURES      modMSQ yes 6/4 (FFU): 6.4 with 5 positions, mild  6/30: 0    MSQ yes 6/6/25: 3.9% with 6 positions  6/30: 0    FGA  6/12/25: 30/30 (WNL)    SOT  6/6/25: all WNL    DVA yes 6/30: H/V WNL    GST  Completed (performed by Shahla Hernandez PT with Yasmine Merrill PT supervising)    Gait Speed      BCTT  6/10: passed    THER-EX   (CPT 67004) Y/N SETS REPS LOAD Not performed SYMPTOMS   STRETCHING                           STRENGTHENING         LE STRENGTHENING                                                      CARDIOVASCULAR      NuStep      Recumbent bike      Treadmill      THER ACT  (CPT 03349) Y/N  8-22 Minutes   SYMPTOMS   Review pain, meds, falls, vitals, symptoms yes Reviewed with patient    *Subjective Measures       DHI yes IE: 44           PN: 12              ABC yes IE: 84.3  PN: 95.6%    HEP  - CV activity (see below)   - VOR x1 @ 120bpm x60s ea on busy background, standing on cushion, 3-4x/daily (reviewed maintaining symptoms 2 points from baseline, reducing speed if symptoms are increasing past 2 point threshold or if symptoms lasting > 20 minutes).  - VOR-C @ SSV x30s ea, 3x/day    Therapeutic Rest yes Throughout session for symptom management    Patient Education yes 6/30/25: Educated pt on rationale for progress assessment and outcomes performed today.  Educated pt on results of outcomes and associated meaning. Educated pt on POC moving forward, reviewed goals and HEP.  Answered all pt questions. Pt verbalized and demonstrated understanding.       6/25/25: Educated pt regarding the results of GST " testing today and to continue working on her H/VVOR HEP.  Discussed the possibility of assessing vertical DVA with her progress note next visit. Pt verbalized understanding and agreement.     6/18/25: Educated pt regarding HEP - inc VOR speeds and continue cardio. Pt verbalized understanding and agreement.     6/16/25: Educated pt to work on her reading tolerance, trying to read on paper for 10 min, then rest for 5 min, then read for another 10 min.  Educated pt that if she is not able to tolerate this much readying, then to work up to this.  Pt is to perform this in order to work on improving her eye strain and reading tolerance while awaiting her OT eval (guidelines given after brief discussion with OT).  H/VVOR to be performed at 125 bpm with a busy background, standing on a pillow.  Pt verbalized understanding and agreement.        Educated pt on rationale for positional testing, findings of positional testing inconsistent with BPPV, rationale for assessment of FGA and outcome of FGA, progression of gaze stability, habituation to bending  -Pt verbalized understanding.         modified Motion Sensitivity Test     Date__________    All movements are done in standing, eyes are open, in front of a plain wall  Symptoms must return to baseline before the next movement is performed  Mayelin records the change of intensity from baseline, then after the movement is completed    Intensity 0-10  Duration <5 s =0  Score = Intensity + Duration    0 = none   5-10 s =1    10 = severe   11-20s =2    21-30s =3    >30 s =4    Baseline Symptoms = 0   MOVEMENT Intensity Duration Score   1. 5x Horizontal head turns 0     2. 5x Vertical head turns 0     3. 5x Right diagonal head turns  (upper left down to right) 0     4. 5x Left diagonal head turns  (upper right down to left) 0     5. 5x Trunk Bends  (bending knees reaching to floor) 0     6. 5x Right quarter body turns  (Look over right shoulder with trunk rotation, feet planted) 0      7. 5x Left quarter body turns (Look over left shoulder with trunk rotation, feet planted) 0     8. 1x 360 degree turn to right 0     9. 1x 360 degree turn to left 0     10. 5x VOR cancellation  (follow thumbs horizontally with head/trunk rotation X45 degrees  each way) 0     Total score      mMSQ = Total score x (# of positions) / 14.00 MSQ = _0   ______ X _____ /14.00   0-10 mild range  11-30 moderate   severe      Date: 6/30 Baseline Level (0-5):0   MSQ: 0      Position Change Intensity Adjusted Intensity Duration Score   1.Sitting to Supine 0      2.Supine to Left Side Lying 0      3.Supine to Right Side Lying 0      4.Supine to Sitting 0      5.Left Hallpike 0      6.Return to Sitting 0      7.Right Hallpike 0      8.Return to Sitting 0      9.Sitting to Nose to Left Knee 0      10.Return to Sitting 0      11.Sitting to Nose to Right Knee 0      12.Return to Sitting 0      13.Sitting with Head Rotation (Side-Side 5x) 0      14.Sitting with Head Flex & Ext (Nod 5x) 0      15.Standing with Turn 180* to Right 0      16.Standing with Turn 180* to Left 0          Total   Score = 0     Intensity: Scale from 0 to 5 (0 = No sx; 5 = Severe sx per pt's subjective report)  Adjusted Intensity: Intensity Level - Baseline Level  Duration: Scale from 0 to 3 for return to baseline (5-10 sec = 1, 11-30 sec = 2, >30 sec = 3)  Score: Adjusted Intensity + Duration    Motion Sensitivity Quotient: # Provoking Positions x Total Score x100 =  ________________                                                                               2048                                 *If <16 positions are tested, 2048 is replaced with 8 x (# of positions tested)2   (ex: Tested 12 positions, so 8 x (12)2 = 1152 and new MSQ is (# Provoking Positions x Total Score)/1152    Vince FW, Mauricio SPAIN. Validity and reliability of the Motion Sensitivity Test.    Journal of Rehabilitation Research and Development. 2003;40(5):415-422.        HOME  CARDIO PROGRAM  Walk, indoor bike, or swim  5 minute warm up to allow heart rate to gradually increase  20 minute active phase between 135-170bpm (or RPE between 11-15 per chart below)  5 minute cool down to allow heart rate to gradually decrease   3-7 days / week         [facility] Treadmill Test  Date:  6/10/25    Baseline overall symptom level = 0  60% MHR = 115         70% MHR = 134  80% MHR = 154  90% MHR = 173    Test performed at speed 3.3    Minute Incline HR RPE Symptoms Comments   0 0 112 6 0    1 1% 128 7 0    2 2% 126 8 0    3 3% 128 8 0    4 4% 130 8 0    5 5% 128 9 0    6 6% 131 9 0    7 7% 138 9 0    8 8% 150 9 0    9 9% 147 9 0    10 10% 160 10 0    11 11% 166 10 0    12 12% 168 10 0    13 13% 171 11 0    14 14% 174 11 0    COOL DOWN        15 0%, 2.0mph 173 9 0    16 0%, 2.0mph 151 7 0    17 0%, 2.0mph 149 6 0      Posttest assessment:   Test stopped at: 14 minutes due to pt achieved 90% of age predicted HR max without increase in symptoms  Post test vitals = 118/79; 119bpm; 97%   Overall symptom level = no change       Date: _6/6/25 Baseline Level (0-5): 0  MSQ: 3.9%      Position Change Intensity Adjusted Intensity Duration Score   1.Sitting to Supine 0      2.Supine to Left Side Lying 0      3.Supine to Right Side Lying 0      4.Supine to Sitting 0.5  7s (1)  1.5   5.Left Hallpike 0.5  > 30s (3) 3.5   6.Return to Sitting 1  22s (2) 3   7.Right Hallpike 0      8.Return to Sitting 0.5  8s (1) 1.5   9.Sitting to Nose to Left Knee 0.5  7s (1) 1.5   10.Return to Sitting 0      11.Sitting to Nose to Right Knee 0.5  6s (1)    12.Return to Sitting 0      13.Sitting with Head Rotation (Side-Side 5x) 0      14.Sitting with Head Flex & Ext (Nod 5x) 0.5  16s (2) 2.5   15.Standing with Turn 180* to Right 0      16.Standing with Turn 180* to Left           Total   Score = 13.5      Intensity: Scale from 0 to 5 (0 = No sx; 5 = Severe sx per pt's subjective report)  Adjusted Intensity: Intensity Level -  Baseline Level  Duration: Scale from 0 to 3 for return to baseline (5-10 sec = 1, 11-30 sec = 2, >30 sec = 3)  Score: Adjusted Intensity + Duration    Motion Sensitivity Quotient: # Provoking Positions x Total Score x100 =  ________________                                                                               2048                                 *If <16 positions are tested, 2048 is replaced with 8 x (# of positions tested)2   (ex: Tested 12 positions, so 8 x (12)2 = 1152 and new MSQ is (# Provoking Positions x Total Score)/1152    Vince FW, Mauricio SPAIN. Validity and reliability of the Motion Sensitivity Test.    Journal of Rehabilitation Research and Development. 2003;40(5):415-422.        modified Motion Sensitivity Test     Date__6/4/25_    All movements are done in standing, eyes are open, in front of a plain wall  Symptoms must return to baseline before the next movement is performed  Mayelin records the change of intensity from baseline, then after the movement is completed    Intensity 0-10  Duration <5 s =0  Score = Intensity + Duration    0 = none   5-10 s =1    10 = severe   11-20s =2                                                   21-30s =3                                                   >30 s =4    Baseline Symptoms = 0  MOVEMENT Intensity Duration Score   1. 5x Horizontal head turns 1 13s (2) 3   2. 5x Vertical head turns 1 15s (2) 3   3. 5x Right diagonal head turns  (upper left down to right) 0     4. 5x Left diagonal head turns  (upper right down to left) 0     5. 5x Trunk Bends  (bending knees reaching to floor) 2 21s (3) 5   6. 5x Right quarter body turns  (Look over right shoulder with trunk rotation, feet planted) 0     7. 5x Left quarter body turns (Look over left shoulder with trunk rotation, feet planted) 1 9s (1) 2   8. 1x 360 degree turn to right 0     9. 1x 360 degree turn to left 0     10. 5x VOR cancellation  (follow thumbs horizontally with head/trunk rotation X45  degrees  each way) 2 30s (3) 5   Total score   18   mMSQ = Total score x (# of positions) / 14.00 MSQ = 6.4  0-10 mild range  11-30 moderate   severe

## 2025-07-07 ENCOUNTER — HOSPITAL ENCOUNTER (OUTPATIENT)
Dept: PHYSICAL THERAPY | Facility: REHABILITATION | Age: 20
Setting detail: THERAPIES SERIES
Discharge: HOME | End: 2025-07-07
Attending: FAMILY MEDICINE
Payer: COMMERCIAL

## 2025-07-07 DIAGNOSIS — H55.09 NYSTAGMUS, VESTIBULAR: ICD-10-CM

## 2025-07-07 DIAGNOSIS — H81.90 NYSTAGMUS, VESTIBULAR: ICD-10-CM

## 2025-07-07 DIAGNOSIS — S06.0X0A CONCUSSION WITHOUT LOSS OF CONSCIOUSNESS, INITIAL ENCOUNTER: Primary | ICD-10-CM

## 2025-07-07 PROCEDURE — 97530 THERAPEUTIC ACTIVITIES: CPT | Mod: GP

## 2025-07-07 PROCEDURE — 97112 NEUROMUSCULAR REEDUCATION: CPT | Mod: GP

## 2025-07-07 NOTE — OP PT TREATMENT LOG
"P.T. TREATMENT LOG    Treatment Current Session Time    CANALITH  REPOSITIONING TREATMENT  (CPT 70571) Y/N Notes Not performed SYMPTOMS   CRT       NEURO RE-ED  (CPT 99084) Y/N Notes 38-52 Minutes   SYMPTOMS   Oculomotor exam  See vestibular chapter    BPPV ASSESSMENT  See vestibular chapter    VOR CANCELLATION                       Standing H/VVOR-C  Standing facing window:   - HVOR-C x 1 min with dizziness inc to 0.5/10, 1.5/10 eye strain.   - VVOR-C x 1 min with 0.5/10 dizziness, 2/10 eye strain    Ambulation w/ H/VVOR-C yes Amb with VOR-C in long hallway with windows, alternating R/L turns every 3 reps:   - HVOR-C x 100 ft no sx  - VVOR-C x 100 ft with eye strain inc to 1/10    VOR / GAZE STABILITY      Standing H/VVOR   No                     BASELINE: 0/10 eye strain  Standing 4' from 1\" B on blue background:  HVOR @ 105>110 bpm x60s: 0.5/10 dizziness, target clear    - standing rest for symptom resolution    VVOR @ 103 > 110bpm x60s: 0.5/10 dizziness, \"and a little bit of eye strain when my head is down and I'm looking at the B\"    Repeated above with busy background (balloons):  HVOR @ 110 > 120  bpm x60s: no dizziness, balloons were blurry but B was clear.     VVOR @ 110 > 120 bpm x60s: 0/10 dizziness, 1.5/10 eye strain     Ambulation w/H/VVOR           n Walking backward/forward with 180* turns between targets on patterned backgrounds:   - HVOR at 125 bpm x 1 min, 1/10 dizziness.    - VVOR at 125 bpm x 1 min, 0.5/10 dizziness    Walking backward/forward 4 steps, focusing on 1\" B on red with white dot background:   - HVOR at 120 bpm x 1 min with eye strain inc from 1/10 to 1.5/10.  No inc dizziness.   - VVOR at 120 bpm x 1 min with eye strain inc from 1/10 to 1.5/10, no dizziness.      H/VVOR on compliant surfaces yes On trampoline bouncing, 4' from 1\" B on black/white berry background:  -HVOR @ 140 bpm x60s.  No inc sxs.    -VVOR @ 140 bpm x60s: eye strain inc from 0 o 0.5/10, mainly when head nods down " "and eyes up.       H/VVOR on sway surface  On a/p rocker, 4' from 1\" B on apple background:   - HVOR at 120 bpm x 1 min with report of mild 1/10 eye strain above L eye.  Reports no dizziness, but mild imbalance feeling.    - VVOR at 120 bpm x 1 min.  Reports 1.5/10 eye strain, but no dizziness.      Bertec R/L VMS Training  Brief, with varying optotypes and speeds and backgrounds.    H/VVOR w/ linear movement      Functional VOR yes VORx2 to red dot on wavy checkerboard background (projector screen) x 60s @ 140bpm, no inc sx both H/VVORx2    HABITUATION      Bending                                                                         yes Lift/chop focusing on patterned colorful ball, back to wall:   - down left to up right x 5 with 1/10 dizziness when looking up right, but resolved immediately when stopped.    - down right to up left x 5 without c/o.   - down left to up right x 5 at faster pace, 1/10 coming up to R.   - down R to up L x 5 at faster pace, no c/o.        Blazepods:  - All 6 pods on floor in a small arc in front of pt, no light delay, tap yellow (not blue) when it lights up.  Performed for 1 min with inc dizziness 1.5-2/10.    - All pods on floor in small arc in front of pt, 1s delay, tap blue pod when it lights up (rest not lit).  Performed for 1 min.  24 hits.  Within the last 10 seconds, felt like dizziness started at 1/10.      1) 3 on lowered mat, 3 on floor.  Light delay random between 2-4s.  X2 min. No significant dizziness  2) All pods on floor in small arc in front of pt, light delay to 0.5-2s.  X2 min.  1/10 dizziness @ 1:15s left, paused to allow rest.  1/10 dizziness at @ 33s left, paused to allow rest.  At end, almost 1/10 dizziness.  Seated rest before proceeding to 3.  3) repeated 2. 1/10 dizziness @ 1:20s left, 1/1 dizziness at 33s left, 1/10 dizziness at end.    Rest breaks throughout for symptom management.    All 6 pods on floor in hexagon pattern with corresponding color cone and " letters, tapping with foot then bending to  cone, remembering letter from previous cone and giving word starting with letter x 2 trials x 2 min each - no sx    Turning                Tennis ball catch from one side and place in basket on other side:   - catch from L, place in basket on R x 20 balls with 2/10 dizziness and 2/10 eye strain.    - catch from R, place in basket on L x 20 balls.  1/10 dizziness and 1/10 L eye strain.     Tennis ball catch from in front, then turn to place in basket behind pt, alternating sides x 20 balls. 0.5/10 dizziness.  1/10 eye strain.     Bending/turning combined  Walk full [place] around cone, eyes focused on the cone, bend to  the cone and follow it with eyes as arc overhead to hand to PT behind pt.  Repeated for 6 cones placed ~3 ft apart.  2 reps with 1/10 dizziness after each.      Retrieve ball from bounce/catch/toss and turn to reach overhead to place ball in basket on high cabinet, with patterned tablecloth on the floor. Minimal to no inc dizziness reported with reps.     Bend to touch one of 6 blaze pods, turn around to retrieve [place] from window about 10 ft away, turn and bend to place in bucket, then repeat x 2 min with cog multitasking:   -1st rep: 2/10 dizziness, no inc eye strain.   - 2nd rep: 0/10 dizziness, no inc eye strain    Figure 8s      Repetitive functional movements yes Bending for extended time picking up bananagrams to make 4 letter word prior to returning to standing x 5 min trials x2 - dizziness 1/10    BALANCE- STATIC      On floor      Airex foam      Rockerboard      LOS TRAINING      Weight shifting w/ ankle strategy      Reaching w/ ankle strategy      Biodex training      SLS STABILITY      Alternating toe taps      Slow marching      High knee walking      Cone tapping       BALANCE- DYNAMIC      Ambulation-head turns/nods      Ambulation - 180 & 360 degree turns      Retro ambulation EO      Ambulation with EC      Obstacles     "  Tandem      REACTIVE BALANCE      Perturbation on rocker/airex      Ball tap on airex      Airex/rocker step ups      Rebounder ball toss on rocker      Ball tap while walking      STS w/ TB around hips      Backward step to bounce off large therapy ball > fwd step      Lateral step w/ TB around hips      Backward step w/ TB around hips      LE COORDINATION      Floor ladder      4 squares      MULTITASKING      Reading task w/ gait activity      Cognitive task w/ gait activity      Negotiating obstacles while carrying ball on cone      OPTOKINETIC STIM      Bertec training  Visual flow [employer]: slow speed, med difficulty, movement gain 0.5-2, testing time 1 min, shelf width normal, shelf density med, floor tiled.  3 trials performed with pt reporting feeling slightly symptomatic, but not \"dizzy\" and not \"eye strain\".            OBJECTIVE MEASURES      modMSQ  6/4 (FFU): 6.4 with 5 positions, mild  6/30: 0    MSQ  6/6/25: 3.9% with 6 positions  6/30: 0    FGA  6/12/25: 30/30 (WNL)    SOT  6/6/25: all WNL    DVA  6/30: H/V WNL    GST  Completed (performed by Shahla Hernandez PT with Yasmine Merrill PT supervising)    Gait Speed      BCTT  6/10: passed    THER-EX   (CPT 63850) Y/N SETS REPS LOAD Not performed SYMPTOMS   STRETCHING                           STRENGTHENING         LE STRENGTHENING                                                      CARDIOVASCULAR      NuStep      Recumbent bike      Treadmill      THER ACT  (CPT 58055) Y/N  8-22 Minutes   SYMPTOMS   Review pain, meds, falls, vitals, symptoms yes Reviewed with patient    *Subjective Measures       DHI  IE: 44           PN: 12              ABC  IE: 84.3  PN: 95.6%    HEP  - CV activity (see below)   - VOR x1 @ 120bpm x60s ea on busy background, standing on cushion, 3-4x/daily (reviewed maintaining symptoms 2 points from baseline, reducing speed if symptoms are increasing past 2 point threshold or if symptoms lasting > 20 minutes).  - VOR-C @ SSV x30s " ea, 3x/day    Therapeutic Rest yes Throughout session for symptom management    Patient Education yes 7/8/25: reviewed HEP, educated regarding progression at present, possibly inc VOR speed at home if tolerated, need for OT - she states understanding  6/30/25: Educated pt on rationale for progress assessment and outcomes performed today.  Educated pt on results of outcomes and associated meaning. Educated pt on POC moving forward, reviewed goals and HEP.  Answered all pt questions. Pt verbalized and demonstrated understanding.       6/25/25: Educated pt regarding the results of GST testing today and to continue working on her H/VVOR HEP.  Discussed the possibility of assessing vertical DVA with her progress note next visit. Pt verbalized understanding and agreement.     6/18/25: Educated pt regarding HEP - inc VOR speeds and continue cardio. Pt verbalized understanding and agreement.     6/16/25: Educated pt to work on her reading tolerance, trying to read on paper for 10 min, then rest for 5 min, then read for another 10 min.  Educated pt that if she is not able to tolerate this much readying, then to work up to this.  Pt is to perform this in order to work on improving her eye strain and reading tolerance while awaiting her OT eval (guidelines given after brief discussion with OT).  H/VVOR to be performed at 125 bpm with a busy background, standing on a pillow.  Pt verbalized understanding and agreement.        Educated pt on rationale for positional testing, findings of positional testing inconsistent with BPPV, rationale for assessment of FGA and outcome of FGA, progression of gaze stability, habituation to bending  -Pt verbalized understanding.         modified Motion Sensitivity Test     Date__________    All movements are done in standing, eyes are open, in front of a plain wall  Symptoms must return to baseline before the next movement is performed  Mayelin records the change of intensity from baseline, then  after the movement is completed    Intensity 0-10  Duration <5 s =0  Score = Intensity + Duration    0 = none   5-10 s =1    10 = severe   11-20s =2    21-30s =3    >30 s =4    Baseline Symptoms = 0   MOVEMENT Intensity Duration Score   1. 5x Horizontal head turns 0     2. 5x Vertical head turns 0     3. 5x Right diagonal head turns  (upper left down to right) 0     4. 5x Left diagonal head turns  (upper right down to left) 0     5. 5x Trunk Bends  (bending knees reaching to floor) 0     6. 5x Right quarter body turns  (Look over right shoulder with trunk rotation, feet planted) 0     7. 5x Left quarter body turns (Look over left shoulder with trunk rotation, feet planted) 0     8. 1x 360 degree turn to right 0     9. 1x 360 degree turn to left 0     10. 5x VOR cancellation  (follow thumbs horizontally with head/trunk rotation X45 degrees  each way) 0     Total score      mMSQ = Total score x (# of positions) / 14.00 MSQ = _0   ______ X _____ /14.00   0-10 mild range  11-30 moderate   severe      Date: 6/30 Baseline Level (0-5):0   MSQ: 0      Position Change Intensity Adjusted Intensity Duration Score   1.Sitting to Supine 0      2.Supine to Left Side Lying 0      3.Supine to Right Side Lying 0      4.Supine to Sitting 0      5.Left Hallpike 0      6.Return to Sitting 0      7.Right Hallpike 0      8.Return to Sitting 0      9.Sitting to Nose to Left Knee 0      10.Return to Sitting 0      11.Sitting to Nose to Right Knee 0      12.Return to Sitting 0      13.Sitting with Head Rotation (Side-Side 5x) 0      14.Sitting with Head Flex & Ext (Nod 5x) 0      15.Standing with Turn 180* to Right 0      16.Standing with Turn 180* to Left 0          Total   Score = 0     Intensity: Scale from 0 to 5 (0 = No sx; 5 = Severe sx per pt's subjective report)  Adjusted Intensity: Intensity Level - Baseline Level  Duration: Scale from 0 to 3 for return to baseline (5-10 sec = 1, 11-30 sec = 2, >30 sec = 3)  Score: Adjusted  Intensity + Duration    Motion Sensitivity Quotient: # Provoking Positions x Total Score x100 =  ________________                                                                               2048                                 *If <16 positions are tested, 2048 is replaced with 8 x (# of positions tested)2   (ex: Tested 12 positions, so 8 x (12)2 = 1152 and new MSQ is (# Provoking Positions x Total Score)/1152    Mauricio Arits. Validity and reliability of the Motion Sensitivity Test.    Journal of Rehabilitation Research and Development. 2003;40(5):415-422.        HOME CARDIO PROGRAM  Walk, indoor bike, or swim  5 minute warm up to allow heart rate to gradually increase  20 minute active phase between 135-170bpm (or RPE between 11-15 per chart below)  5 minute cool down to allow heart rate to gradually decrease   3-7 days / week         [place] Concussion Treadmill Test  Date:  6/10/25    Baseline overall symptom level = 0  60% MHR = 115         70% MHR = 134  80% MHR = 154  90% MHR = 173    Test performed at speed 3.3    Minute Incline HR RPE Symptoms Comments   0 0 112 6 0    1 1% 128 7 0    2 2% 126 8 0    3 3% 128 8 0    4 4% 130 8 0    5 5% 128 9 0    6 6% 131 9 0    7 7% 138 9 0    8 8% 150 9 0    9 9% 147 9 0    10 10% 160 10 0    11 11% 166 10 0    12 12% 168 10 0    13 13% 171 11 0    14 14% 174 11 0    COOL DOWN        15 0%, 2.0mph 173 9 0    16 0%, 2.0mph 151 7 0    17 0%, 2.0mph 149 6 0      Posttest assessment:   Test stopped at: 14 minutes due to pt achieved 90% of age predicted HR max without increase in symptoms  Post test vitals = 118/79; 119bpm; 97%   Overall symptom level = no change       Date: _6/6/25 Baseline Level (0-5): 0  MSQ: 3.9%      Position Change Intensity Adjusted Intensity Duration Score   1.Sitting to Supine 0      2.Supine to Left Side Lying 0      3.Supine to Right Side Lying 0      4.Supine to Sitting 0.5  7s (1)  1.5   5.Left Hallpike 0.5  > 30s (3) 3.5   6.Return to  Sitting 1  22s (2) 3   7.Right Hallpike 0      8.Return to Sitting 0.5  8s (1) 1.5   9.Sitting to Nose to Left Knee 0.5  7s (1) 1.5   10.Return to Sitting 0      11.Sitting to Nose to Right Knee 0.5  6s (1)    12.Return to Sitting 0      13.Sitting with Head Rotation (Side-Side 5x) 0      14.Sitting with Head Flex & Ext (Nod 5x) 0.5  16s (2) 2.5   15.Standing with Turn 180* to Right 0      16.Standing with Turn 180* to Left           Total   Score = 13.5      Intensity: Scale from 0 to 5 (0 = No sx; 5 = Severe sx per pt's subjective report)  Adjusted Intensity: Intensity Level - Baseline Level  Duration: Scale from 0 to 3 for return to baseline (5-10 sec = 1, 11-30 sec = 2, >30 sec = 3)  Score: Adjusted Intensity + Duration    Motion Sensitivity Quotient: # Provoking Positions x Total Score x100 =  ________________                                                                               2048                                 *If <16 positions are tested, 2048 is replaced with 8 x (# of positions tested)2   (ex: Tested 12 positions, so 8 x (12)2 = 1152 and new MSQ is (# Provoking Positions x Total Score)/1152    Vince PARKER, Mauricio SPAIN. Validity and reliability of the Motion Sensitivity Test.    Journal of Rehabilitation Research and Development. 2003;40(5):415-422.        modified Motion Sensitivity Test     Date__6/4/25_    All movements are done in standing, eyes are open, in front of a plain wall  Symptoms must return to baseline before the next movement is performed  Mayelin records the change of intensity from baseline, then after the movement is completed    Intensity 0-10  Duration <5 s =0  Score = Intensity + Duration    0 = none   5-10 s =1    10 = severe   11-20s =2                                                   21-30s =3                                                   >30 s =4    Baseline Symptoms = 0  MOVEMENT Intensity Duration Score   1. 5x Horizontal head turns 1 13s (2) 3   2. 5x Vertical head  turns 1 15s (2) 3   3. 5x Right diagonal head turns  (upper left down to right) 0     4. 5x Left diagonal head turns  (upper right down to left) 0     5. 5x Trunk Bends  (bending knees reaching to floor) 2 21s (3) 5   6. 5x Right quarter body turns  (Look over right shoulder with trunk rotation, feet planted) 0     7. 5x Left quarter body turns (Look over left shoulder with trunk rotation, feet planted) 1 9s (1) 2   8. 1x 360 degree turn to right 0     9. 1x 360 degree turn to left 0     10. 5x VOR cancellation  (follow thumbs horizontally with head/trunk rotation X45 degrees  each way) 2 30s (3) 5   Total score   18   mMSQ = Total score x (# of positions) / 14.00 MSQ = 6.4  0-10 mild range  11-30 moderate   severe

## 2025-07-08 ENCOUNTER — HOSPITAL ENCOUNTER (OUTPATIENT)
Dept: OCCUPATIONAL THERAPY | Facility: REHABILITATION | Age: 20
Setting detail: THERAPIES SERIES
Discharge: HOME | End: 2025-07-08
Attending: PHYSICAL MEDICINE & REHABILITATION
Payer: COMMERCIAL

## 2025-07-08 DIAGNOSIS — S06.0X0A CONCUSSION WITHOUT LOSS OF CONSCIOUSNESS, INITIAL ENCOUNTER: Primary | ICD-10-CM

## 2025-07-08 DIAGNOSIS — H54.7 FUNCTIONAL VISION PROBLEM: ICD-10-CM

## 2025-07-08 PROCEDURE — 97530 THERAPEUTIC ACTIVITIES: CPT | Mod: GO

## 2025-07-08 PROCEDURE — 97112 NEUROMUSCULAR REEDUCATION: CPT | Mod: GO
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
## 2025-07-08 NOTE — OP OT TREATMENT LOG
"VISION/CONCUSSION OT FLOW SHEET     OT Vision/mTBI  EXERCISES CURRENT SESSION TIME   NEURO RE-ED  CPT 77999 TOTAL TIME FOR SESSION 30 Minutes   Dynavision       VTS3/VTS4       CPT       BITs       NVR       Saccadic Fixation       Ocular Motor Control  Brainwaves Day 1   -Functional saccades with ocular motor control in multi stim environment   - sitting at tabletop patient maintained a gaze fixation hold to decode message while scanning rotary ABC's.    -rise in eye strain +2/10     Visual Tracing       3D Tracking       Visual Perception       Convergence/Divergence  Eye teaming with static, continuous, and jump convergence using Nico String    - Trial 1 with 10 sec holds per bead, diplopia reported at 6 \"    - Trial 2 jump convergence x10 rounds, diplopia reported at 6\"     Accommodation  Functional accommodation with oculomotor control for arrow grid design copy:   - Trial 1: exact copy   - Trial 2: grid on tabletop rotated 90 degrees, to locate open spaces in 10x10 grid by their coordinates (vs. matching arrows)    -moderate rise in OS pain inner corner with superior gaze fixation at far point   -rise in eye strain +3/10    -instructed pt in implementing rest breaks for 2 point rise rule     Visual Stimulation       THER ACT  CPT 49983 TOTAL TIME FOR SESSION 25 Minutes   Pain, Vitals, Meds, Etc.  Assessed; monitored; reviewed     HEP Added- pomodor reading   TODAY: Reviewed with patient:   Instructed vision HEP, to be completed monocularly/binocularly not on therapy days. Encouraged pt to complete with 2 pt rise rule:  -AROM gaze fixation  -pencil pushups  -short saccades (hor and vert)  -line tracing     Visual Endurance        Work/School Simulation        Assessments RIGHTEYE    SELF CARE  CPT 46460 TOTAL TIME FOR SESSION Not performed   PCS Symptom Management/Education Pt educated re: results of assessments, deficits noted and role of OT in rehab/habituation of functional deficit areas. In addition, " lengthy edu re: how threshold for tolerance can change following injury to the brain with specific re: to multi-sensory intolerance. Discussed 2 pt rise rule to improve tolerance/threshold providing examples. Pt would benefit from reinforcement throughout subsequent sessions as needed.      ADL/IADLs       GROUP  CPT 06745 TOTAL TIME FOR SESSION Not performed                   (4) excellent
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
## 2025-07-14 ENCOUNTER — HOSPITAL ENCOUNTER (OUTPATIENT)
Dept: PHYSICAL THERAPY | Facility: REHABILITATION | Age: 20
Setting detail: THERAPIES SERIES
Discharge: HOME | End: 2025-07-14
Attending: FAMILY MEDICINE
Payer: COMMERCIAL

## 2025-07-14 DIAGNOSIS — H55.09 NYSTAGMUS, VESTIBULAR: ICD-10-CM

## 2025-07-14 DIAGNOSIS — S06.0X0A CONCUSSION WITHOUT LOSS OF CONSCIOUSNESS, INITIAL ENCOUNTER: Primary | ICD-10-CM

## 2025-07-14 DIAGNOSIS — H81.90 NYSTAGMUS, VESTIBULAR: ICD-10-CM

## 2025-07-14 PROCEDURE — 97530 THERAPEUTIC ACTIVITIES: CPT | Mod: GP

## 2025-07-14 NOTE — PROGRESS NOTES
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 mild range  11-30 moderate   severe       Goals Addressed                   This Visit's Progress     [facility] PT Vestibular Goals          Short Term Goals Time Frame Result Comment/Progress   Patient will be assessed for motion sensitivity via MSQ.  (2% or less = WNL)    4-6 weeks met    Pt will be assessed for balance integration via SOT.  (score < 38 increases likelihood ratio of falls; MDC 8 points) 4-6 weeks met    Patient will be assessed for gait and balance stability via FGA.  (fall risk </=22/30; MDC 6 points for vestibular diagnoses)   4-6 weeks met    Patient will be assessed for dynamic visual acuity via DVA.  (Norm= 2 lines or less) 4-6 weeks met    Patient will perform home exercise program with supervision.   4-6 weeks met    Patient will tolerate 15 minutes of cardiovascular conditioning at (30-40% max HR / 40-60% max HR / 60-75% max HR / % max HR).   4-6 weeks met    Patient will demonstrate ability to manage symptoms with <20% cues. 4-6 weeks met    Pt will be assessed for BPPV. 4-6 weeks met    Patient will improve score on ABC by 5% for decreased report of imbalance with daily activities.   (< 67% indicates fall risk in vestibular disorders) 4-6 weeks met    Patient will improve score on DHI by 9 pts for decreased report of dizziness with daily activities.  (18 points= MCID) 4-6 weeks  met          Long Term Goals Time Frame Result Comment/Progress   Patient will decrease Motion Sensitivity Quotient to 2 % for increased functional tolerance.   (2% or less =WNL)   8-12 weeks met    Patient will increase Balance Master SOT composite score to WNL for increased postural control.  (score < 38 increases likelihood ratio of falls; MDC 8 points) 8-12 weeks met    Patient will increase FGA score to >/= 29/30 for increased gait stability and balance.    (fall risk </=22/30; MDC 6 points for vestibular diagnoses) 8-12 weeks met    Patient will decrease DVA to 2 lines for functional improved gaze stability. 8-12 weeks met    Patient will perform home exercise program independently.   8-12 weeks met    Patient will tolerate 30 minutes of cardiovascular conditioning at (30-40% max HR / 40-60% max HR / 60-75% max HR / % max HR).   8-12 weeks met    Patient will demonstrate independence with managing any residual symptoms. 8-12 weeks met    Patient will improve score on ABC to > 80% for decreased report of imbalance with daily activities.    (>/= 80% indicative of increased function) 8-12 weeks met    Patient will demonstrate ability to participate in physical activity at 90% of age predicted max HR without symptoms via ability to pass BCTT. 8-12 weeks met    Patient will improve score on DHI by 18 pts from IE for decreased report of dizziness with daily activities.  (18 points= MCID) 8-12 weeks met    Patient will have no increased symptoms with challenging VOR activities for symptom free high-level activities.   8-12 weeks met                    Discharge information for CARF:    Reason for D/C: Goals met  Was care interrupted for medical reason?: No  Did the patient demonstrate increased independence: Yes  Demonstration of independence:: [place] in HEP, Increased functional activity, Increased functional independence  Has the patient returned to productive activity?: Yes  Increased production  assessment:: Increased community independence, Return to participation in hobbies/leisure activities, Return to work/school/volunteer activities, Return to household activities, Return to driving  Skin integrity: Intact - No issues

## 2025-07-14 NOTE — OP PT TREATMENT LOG
"P.T. TREATMENT LOG    Treatment Current Session Time    CANALITH  REPOSITIONING TREATMENT  (CPT 47159) Y/N Notes Not performed SYMPTOMS   CRT       NEURO RE-ED  (CPT 05763) Y/N Notes Not performed   SYMPTOMS   Oculomotor exam  See vestibular chapter    BPPV ASSESSMENT  See vestibular chapter    VOR CANCELLATION                       Standing H/VVOR-C  Standing facing window:   - HVOR-C x 1 min with dizziness inc to 0.5/10, 1.5/10 eye strain.   - VVOR-C x 1 min with 0.5/10 dizziness, 2/10 eye strain    Ambulation w/ H/VVOR-C  Amb with VOR-C in long hallway with windows, alternating R/L turns every 3 reps:   - HVOR-C x 100 ft no sx  - VVOR-C x 100 ft with eye strain inc to 1/10    VOR / GAZE STABILITY      Standing H/VVOR   No                     BASELINE: 0/10 eye strain  Standing 4' from 1\" B on blue background:  HVOR @ 105>110 bpm x60s: 0.5/10 dizziness, target clear    - standing rest for symptom resolution    VVOR @ 103 > 110bpm x60s: 0.5/10 dizziness, \"and a little bit of eye strain when my head is down and I'm looking at the B\"    Repeated above with busy background (balloons):  HVOR @ 110 > 120  bpm x60s: no dizziness, balloons were blurry but B was clear.     VVOR @ 110 > 120 bpm x60s: 0/10 dizziness, 1.5/10 eye strain     Ambulation w/H/VVOR           n Walking backward/forward with 180* turns between targets on patterned backgrounds:   - HVOR at 125 bpm x 1 min, 1/10 dizziness.    - VVOR at 125 bpm x 1 min, 0.5/10 dizziness    Walking backward/forward 4 steps, focusing on 1\" B on red with white dot background:   - HVOR at 120 bpm x 1 min with eye strain inc from 1/10 to 1.5/10.  No inc dizziness.   - VVOR at 120 bpm x 1 min with eye strain inc from 1/10 to 1.5/10, no dizziness.      H/VVOR on compliant surfaces  On trampoline bouncing, 4' from 1\" B on black/white berry background:  -HVOR @ 140 bpm x60s.  No inc sxs.    -VVOR @ 140 bpm x60s: eye strain inc from 0 o 0.5/10, mainly when head nods down and eyes " "up.       H/VVOR on sway surface  On a/p rocker, 4' from 1\" B on apple background:   - HVOR at 120 bpm x 1 min with report of mild 1/10 eye strain above L eye.  Reports no dizziness, but mild imbalance feeling.    - VVOR at 120 bpm x 1 min.  Reports 1.5/10 eye strain, but no dizziness.      Bertec R/L VMS Training  Brief, with varying optotypes and speeds and backgrounds.    H/VVOR w/ linear movement      Functional VOR  VORx2 to red dot on wavy checkerboard background (projector screen) x 60s @ 140bpm, no inc sx both H/VVORx2    HABITUATION      Bending                                                                          Lift/chop focusing on patterned colorful ball, back to wall:   - down left to up right x 5 with 1/10 dizziness when looking up right, but resolved immediately when stopped.    - down right to up left x 5 without c/o.   - down left to up right x 5 at faster pace, 1/10 coming up to R.   - down R to up L x 5 at faster pace, no c/o.        Blazepods:  - All 6 pods on floor in a small arc in front of pt, no light delay, tap yellow (not blue) when it lights up.  Performed for 1 min with inc dizziness 1.5-2/10.    - All pods on floor in small arc in front of pt, 1s delay, tap blue pod when it lights up (rest not lit).  Performed for 1 min.  24 hits.  Within the last 10 seconds, felt like dizziness started at 1/10.      1) 3 on lowered mat, 3 on floor.  Light delay random between 2-4s.  X2 min. No significant dizziness  2) All pods on floor in small arc in front of pt, light delay to 0.5-2s.  X2 min.  1/10 dizziness @ 1:15s left, paused to allow rest.  1/10 dizziness at @ 33s left, paused to allow rest.  At end, almost 1/10 dizziness.  Seated rest before proceeding to 3.  3) repeated 2. 1/10 dizziness @ 1:20s left, 1/1 dizziness at 33s left, 1/10 dizziness at end.    Rest breaks throughout for symptom management.    All 6 pods on floor in hexagon pattern with corresponding color cone and letters, " tapping with foot then bending to  cone, remembering letter from previous cone and giving word starting with letter x 2 trials x 2 min each - no sx    Turning                Tennis ball catch from one side and place in basket on other side:   - catch from L, place in basket on R x 20 balls with 2/10 dizziness and 2/10 eye strain.    - catch from R, place in basket on L x 20 balls.  1/10 dizziness and 1/10 L eye strain.     Tennis ball catch from in front, then turn to place in basket behind pt, alternating sides x 20 balls. 0.5/10 dizziness.  1/10 eye strain.     Bending/turning combined  Walk full [place] around cone, eyes focused on the cone, bend to  the cone and follow it with eyes as arc overhead to hand to PT behind pt.  Repeated for 6 cones placed ~3 ft apart.  2 reps with 1/10 dizziness after each.      Retrieve ball from bounce/catch/toss and turn to reach overhead to place ball in basket on high cabinet, with patterned tablecloth on the floor. Minimal to no inc dizziness reported with reps.     Bend to touch one of 6 blaze pods, turn around to retrieve Squigg from window about 10 ft away, turn and bend to place in bucket, then repeat x 2 min with cog multitasking:   -1st rep: 2/10 dizziness, no inc eye strain.   - 2nd rep: 0/10 dizziness, no inc eye strain    Figure 8s      Repetitive functional movements  Bending for extended time picking up bananagrams to make 4 letter word prior to returning to standing x 5 min trials x2 - dizziness 1/10    BALANCE- STATIC      On floor      Airex foam      Rockerboard      LOS TRAINING      Weight shifting w/ ankle strategy      Reaching w/ ankle strategy      Biodex training      SLS STABILITY      Alternating toe taps      Slow marching      High knee walking      Cone tapping       BALANCE- DYNAMIC      Ambulation-head turns/nods      Ambulation - 180 & 360 degree turns      Retro ambulation EO      Ambulation with EC      Obstacles      Tandem     "  REACTIVE BALANCE      Perturbation on rocker/airex      Ball tap on airex      Airex/rocker step ups      Rebounder ball toss on rocker      Ball tap while walking      STS w/ TB around hips      Backward step to bounce off large therapy ball > fwd step      Lateral step w/ TB around hips      Backward step w/ TB around hips      LE COORDINATION      Floor ladder      4 squares      MULTITASKING      Reading task w/ gait activity      Cognitive task w/ gait activity      Negotiating obstacles while carrying ball on cone      OPTOKINETIC STIM      Bertec training  Visual flow [employer]: slow speed, med difficulty, movement gain 0.5-2, testing time 1 min, shelf width normal, shelf density med, floor tiled.  3 trials performed with pt reporting feeling slightly symptomatic, but not \"dizzy\" and not \"eye strain\".            OBJECTIVE MEASURES      modMSQ  6/4 (FFU): 6.4 with 5 positions, mild  6/30: 0    MSQ  6/6/25: 3.9% with 6 positions  6/30: 0    FGA  6/12/25: 30/30 (WNL)    SOT  6/6/25: all WNL    DVA  6/30: H/V WNL    GST  Completed (performed by Shahla Hernandez PT with Yasmine Merrill PT supervising)    Gait Speed      BCTT  6/10: passed    THER-EX   (CPT 55009) Y/N SETS REPS LOAD Not performed SYMPTOMS   STRETCHING                           STRENGTHENING         LE STRENGTHENING                                                      CARDIOVASCULAR      NuStep      Recumbent bike      Treadmill      THER ACT  (CPT 81525) Y/N  23-37 Minutes   SYMPTOMS   Review pain, meds, falls, vitals, symptoms yes Reviewed with patient    *Subjective Measures       DHI yes IE: 44           PN: 12   DC: 2             ABC yes IE: 84.3  PN: 95.6%  DC: 99.8%    HEP yes Reviewed VORx1 and self progressions, on maintenance at 160bpm.        - CV activity (see below)   - VOR x1 @ 120bpm x60s ea on busy background, standing on cushion, 3-4x/daily (reviewed maintaining symptoms 2 points from baseline, reducing speed if symptoms are " increasing past 2 point threshold or if symptoms lasting > 20 minutes).  - VOR-C @ SSV x30s ea, 3x/day    Therapeutic Rest yes Throughout session for symptom management    Patient Education yes Educated pt on rationale for discharge and ongoing HEP.  Educated pt on symptom management for POTS and to be aware that prolonged bending and return to stand may cause POTS-related symptoms.  Educated pt on continued need for OT to address ocular strain.  Educated pt on obtaining optometry appt.  Pt verbalized understanding to all.         modified Motion Sensitivity Test     Date__________    All movements are done in standing, eyes are open, in front of a plain wall  Symptoms must return to baseline before the next movement is performed  Mayelin records the change of intensity from baseline, then after the movement is completed    Intensity 0-10  Duration <5 s =0  Score = Intensity + Duration    0 = none   5-10 s =1    10 = severe   11-20s =2    21-30s =3    >30 s =4    Baseline Symptoms = 0   MOVEMENT Intensity Duration Score   1. 5x Horizontal head turns 0     2. 5x Vertical head turns 0     3. 5x Right diagonal head turns  (upper left down to right) 0     4. 5x Left diagonal head turns  (upper right down to left) 0     5. 5x Trunk Bends  (bending knees reaching to floor) 0     6. 5x Right quarter body turns  (Look over right shoulder with trunk rotation, feet planted) 0     7. 5x Left quarter body turns (Look over left shoulder with trunk rotation, feet planted) 0     8. 1x 360 degree turn to right 0     9. 1x 360 degree turn to left 0     10. 5x VOR cancellation  (follow thumbs horizontally with head/trunk rotation X45 degrees  each way) 0     Total score      mMSQ = Total score x (# of positions) / 14.00 MSQ = _0   ______ X _____ /14.00   0-10 mild range  11-30 moderate   severe      Date: 6/30 Baseline Level (0-5):0   MSQ: 0      Position Change Intensity Adjusted Intensity Duration Score   1.Sitting to Supine 0       2.Supine to Left Side Lying 0      3.Supine to Right Side Lying 0      4.Supine to Sitting 0      5.Left Hallpike 0      6.Return to Sitting 0      7.Right Hallpike 0      8.Return to Sitting 0      9.Sitting to Nose to Left Knee 0      10.Return to Sitting 0      11.Sitting to Nose to Right Knee 0      12.Return to Sitting 0      13.Sitting with Head Rotation (Side-Side 5x) 0      14.Sitting with Head Flex & Ext (Nod 5x) 0      15.Standing with Turn 180* to Right 0      16.Standing with Turn 180* to Left 0          Total   Score = 0     Intensity: Scale from 0 to 5 (0 = No sx; 5 = Severe sx per pt's subjective report)  Adjusted Intensity: Intensity Level - Baseline Level  Duration: Scale from 0 to 3 for return to baseline (5-10 sec = 1, 11-30 sec = 2, >30 sec = 3)  Score: Adjusted Intensity + Duration    Motion Sensitivity Quotient: # Provoking Positions x Total Score x100 =  ________________                                                                               2048                                 *If <16 positions are tested, 2048 is replaced with 8 x (# of positions tested)2   (ex: Tested 12 positions, so 8 x (12)2 = 1152 and new MSQ is (# Provoking Positions x Total Score)/1152    Vince FW, Mauricio MJ. Validity and reliability of the Motion Sensitivity Test.    Journal of Rehabilitation Research and Development. 2003;40(5):415-422.        HOME CARDIO PROGRAM  Walk, indoor bike, or swim  5 minute warm up to allow heart rate to gradually increase  20 minute active phase between 135-170bpm (or RPE between 11-15 per chart below)  5 minute cool down to allow heart rate to gradually decrease   3-7 days / week         [place] [facility] Treadmill Test  Date:  6/10/25    Baseline overall symptom level = 0  60% MHR = 115         70% MHR = 134  80% MHR = 154  90% MHR = 173    Test performed at speed 3.3    Minute Incline HR RPE Symptoms Comments   0 0 112 6 0    1 1% 128 7 0    2 2% 126 8 0    3 3% 128 8 0     4 4% 130 8 0    5 5% 128 9 0    6 6% 131 9 0    7 7% 138 9 0    8 8% 150 9 0    9 9% 147 9 0    10 10% 160 10 0    11 11% 166 10 0    12 12% 168 10 0    13 13% 171 11 0    14 14% 174 11 0    COOL DOWN        15 0%, 2.0mph 173 9 0    16 0%, 2.0mph 151 7 0    17 0%, 2.0mph 149 6 0      Posttest assessment:   Test stopped at: 14 minutes due to pt achieved 90% of age predicted HR max without increase in symptoms  Post test vitals = 118/79; 119bpm; 97%   Overall symptom level = no change       Date: _6/6/25 Baseline Level (0-5): 0  MSQ: 3.9%      Position Change Intensity Adjusted Intensity Duration Score   1.Sitting to Supine 0      2.Supine to Left Side Lying 0      3.Supine to Right Side Lying 0      4.Supine to Sitting 0.5  7s (1)  1.5   5.Left Hallpike 0.5  > 30s (3) 3.5   6.Return to Sitting 1  22s (2) 3   7.Right Hallpike 0      8.Return to Sitting 0.5  8s (1) 1.5   9.Sitting to Nose to Left Knee 0.5  7s (1) 1.5   10.Return to Sitting 0      11.Sitting to Nose to Right Knee 0.5  6s (1)    12.Return to Sitting 0      13.Sitting with Head Rotation (Side-Side 5x) 0      14.Sitting with Head Flex & Ext (Nod 5x) 0.5  16s (2) 2.5   15.Standing with Turn 180* to Right 0      16.Standing with Turn 180* to Left           Total   Score = 13.5      Intensity: Scale from 0 to 5 (0 = No sx; 5 = Severe sx per pt's subjective report)  Adjusted Intensity: Intensity Level - Baseline Level  Duration: Scale from 0 to 3 for return to baseline (5-10 sec = 1, 11-30 sec = 2, >30 sec = 3)  Score: Adjusted Intensity + Duration    Motion Sensitivity Quotient: # Provoking Positions x Total Score x100 =  ________________                                                                               2048                                 *If <16 positions are tested, 2048 is replaced with 8 x (# of positions tested)2   (ex: Tested 12 positions, so 8 x (12)2 = 1152 and new MSQ is (# Provoking Positions x Total Score)/1152    Mauricio Artis  GRABIEL. Validity and reliability of the Motion Sensitivity Test.    Journal of Rehabilitation Research and Development. 2003;40(5):415-422.        modified Motion Sensitivity Test     Date__6/4/25_    All movements are done in standing, eyes are open, in front of a plain wall  Symptoms must return to baseline before the next movement is performed  Mayelin records the change of intensity from baseline, then after the movement is completed    Intensity 0-10  Duration <5 s =0  Score = Intensity + Duration    0 = none   5-10 s =1    10 = severe   11-20s =2                                                   21-30s =3                                                   >30 s =4    Baseline Symptoms = 0  MOVEMENT Intensity Duration Score   1. 5x Horizontal head turns 1 13s (2) 3   2. 5x Vertical head turns 1 15s (2) 3   3. 5x Right diagonal head turns  (upper left down to right) 0     4. 5x Left diagonal head turns  (upper right down to left) 0     5. 5x Trunk Bends  (bending knees reaching to floor) 2 21s (3) 5   6. 5x Right quarter body turns  (Look over right shoulder with trunk rotation, feet planted) 0     7. 5x Left quarter body turns (Look over left shoulder with trunk rotation, feet planted) 1 9s (1) 2   8. 1x 360 degree turn to right 0     9. 1x 360 degree turn to left 0     10. 5x VOR cancellation  (follow thumbs horizontally with head/trunk rotation X45 degrees  each way) 2 30s (3) 5   Total score   18   mMSQ = Total score x (# of positions) / 14.00 MSQ = 6.4  0-10 mild range  11-30 moderate   severe

## 2025-07-17 ENCOUNTER — HOSPITAL ENCOUNTER (OUTPATIENT)
Dept: OCCUPATIONAL THERAPY | Facility: REHABILITATION | Age: 20
Setting detail: THERAPIES SERIES
Discharge: HOME | End: 2025-07-17
Attending: PHYSICAL MEDICINE & REHABILITATION
Payer: COMMERCIAL

## 2025-07-17 DIAGNOSIS — H54.7 FUNCTIONAL VISION PROBLEM: ICD-10-CM

## 2025-07-17 DIAGNOSIS — S06.0X0A CONCUSSION WITHOUT LOSS OF CONSCIOUSNESS, INITIAL ENCOUNTER: Primary | ICD-10-CM

## 2025-07-17 PROCEDURE — 97530 THERAPEUTIC ACTIVITIES: CPT | Mod: GO

## 2025-07-17 PROCEDURE — 97112 NEUROMUSCULAR REEDUCATION: CPT | Mod: GO
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
## 2025-07-17 NOTE — OP OT TREATMENT LOG
VISION/CONCUSSION OT FLOW SHEET     OT Vision/mTBI  EXERCISES CURRENT SESSION TIME   NEURO RE-ED  CPT 42280 TOTAL TIME FOR SESSION 45 Minutes   Dynavision       VTS3/VTS4       CPT      BITs      NVR      Saccadic Fixation Pt tasked with alternating between the below two worksheets to target divided attention, saccadic fixation, and accomodation. Worksheets placed side by side on tabletop.      ABC Saccades (I and j)   -- Pt tasked with visually scanning between columns/rows to locate all i's and j's. Pt tasked with completing 2 rows before alternating to next worksheet.   -- Pt with no errors noted.      Before/After saccades   -- Pt tasked with completing 1 before and 1 after, then returning back to initial worksheet. Pt continues with this alternation until both worksheets completed.   -- No errors noted.        Ocular Motor Control Spot-It cards; pt completes with central head positioning to isolate ocular ROM. Completes x10 total with no rise in symptoms.        Visual Tracing      3D Tracking      Visual Perception      Convergence/Divergence      Accommodation Targeting EF skills, saccadic efficiency, OMC/gaze fixations, and visual memory/recall:     Noun Scramble - Astronomy words  -- Pt tasked with visually scanning left < > right to locate scrambled words in alphabetical order. Words placed on window with trees, birds, etc for added visual clutter.   -- Once located, pt tasked with unscrambling words and writing them down on worksheet at tabletop.   -- Pt then tasked with utilizing bananagram tiles (scattered across tabletop) to spell out words and later alphabetize.   -- Pt benefited from cues to keep head at midline to encourage saccadic fixation of eyes during activity; pt reporting this is challenging.   -- Pt reports mild eye strain fluctuating throughout task; otherwise no rise in symptoms.   -- Added cog challenge for pt to then recall words from memory; able to write down 14/20 words.        Visual  Stimulation       THER ACT  CPT 52316 TOTAL TIME FOR SESSION 10 Minutes   Pain, Vitals, Meds, Etc.  Assessed; monitored; reviewed     HEP 7/17: Reviewed/reinforced HEP. Pt reporting compliance. Able to complete Pomodoro reading 15 mins on, 5 min break, 15 mins on.       Added- pomodor reading   TODAY: Reviewed with patient:   Instructed vision HEP, to be completed monocularly/binocularly not on therapy days. Encouraged pt to complete with 2 pt rise rule:  -AROM gaze fixation  -pencil pushups  -short saccades (hor and vert)  -line tracing     Visual Endurance        Work/School Simulation        Assessments RIGHTEYE    SELF CARE  CPT 05456 TOTAL TIME FOR SESSION Not performed   PCS Symptom Management/Education Pt educated re: results of assessments, deficits noted and role of OT in rehab/habituation of functional deficit areas. In addition, lengthy edu re: how threshold for tolerance can change following injury to the brain with specific re: to multi-sensory intolerance. Discussed 2 pt rise rule to improve tolerance/threshold providing examples. Pt would benefit from reinforcement throughout subsequent sessions as needed.      ADL/IADLs       GROUP  CPT 94824 TOTAL TIME FOR SESSION Not performed

## 2025-07-18 ENCOUNTER — HOSPITAL ENCOUNTER (OUTPATIENT)
Dept: OCCUPATIONAL THERAPY | Facility: REHABILITATION | Age: 20
Setting detail: THERAPIES SERIES
Discharge: HOME | End: 2025-07-18
Attending: PHYSICAL MEDICINE & REHABILITATION
Payer: COMMERCIAL

## 2025-07-18 DIAGNOSIS — S06.0X0A CONCUSSION WITHOUT LOSS OF CONSCIOUSNESS, INITIAL ENCOUNTER: Primary | ICD-10-CM

## 2025-07-18 DIAGNOSIS — H54.7 FUNCTIONAL VISION PROBLEM: ICD-10-CM

## 2025-07-18 PROCEDURE — 97112 NEUROMUSCULAR REEDUCATION: CPT | Mod: GO

## 2025-07-18 PROCEDURE — 97530 THERAPEUTIC ACTIVITIES: CPT | Mod: GO

## 2025-07-18 NOTE — OP OT TREATMENT LOG
VISION/CONCUSSION OT FLOW SHEET     OT Vision/mTBI  EXERCISES CURRENT SESSION TIME   NEURO RE-ED  CPT 70449 TOTAL TIME FOR SESSION 40 Minutes   Dynavision  B 5 sec   -59 targets, 1.0 sec   -69 targets, 0.85 sec  -74 targets, 0.81 sec  -no head mvmts: 60 targets, 0.99 sec  -red/green 26/ 1.07 sec, 1.20 sec        VTS3/VTS4       CPT      BITs      NVR      Saccadic Fixation Assessed, KDT, Short saccades     Ocular Motor Control Assessed     Visual Tracing      3D Tracking Assessed     Visual Perception      Convergence/Divergence Assessed     Accommodation Assessed     Visual Stimulation       THER ACT  CPT 34309 TOTAL TIME FOR SESSION 15 Minutes   Pain, Vitals, Meds, Etc.  Assessed; monitored; reviewed  Assessed SCOAT-6     HEP 7/18: Reviewed/reinforced HEP. Pt reporting compliance. Able to complete Pomodoro reading 15 mins on, 5 min break, 15 mins on.     Added- pomodoro reading   TODAY: Reviewed with patient:   Instructed vision HEP, to be completed monocularly/binocularly not on therapy days. Encouraged pt to complete with 2 pt rise rule:  -AROM gaze fixation  -pencil pushups  -short saccades (hor and vert)  -line tracing     Visual Endurance        Work/School Simulation        Assessments RIGHTEYE    SELF CARE  CPT 51446 TOTAL TIME FOR SESSION Not performed   PCS Symptom Management/Education Pt educated re: results of assessments, deficits noted and role of OT in rehab/habituation of functional deficit areas. In addition, lengthy edu re: how threshold for tolerance can change following injury to the brain with specific re: to multi-sensory intolerance. Discussed 2 pt rise rule to improve tolerance/threshold providing examples. Pt would benefit from reinforcement throughout subsequent sessions as needed.      ADL/IADLs       GROUP  CPT 51684 TOTAL TIME FOR SESSION Not performed
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
## 2025-07-21 ENCOUNTER — HOSPITAL ENCOUNTER (OUTPATIENT)
Dept: OCCUPATIONAL THERAPY | Facility: REHABILITATION | Age: 20
Setting detail: THERAPIES SERIES
Discharge: HOME | End: 2025-07-21
Attending: PHYSICAL MEDICINE & REHABILITATION
Payer: COMMERCIAL

## 2025-07-21 DIAGNOSIS — S06.0X0A CONCUSSION WITHOUT LOSS OF CONSCIOUSNESS, INITIAL ENCOUNTER: Primary | ICD-10-CM

## 2025-07-21 DIAGNOSIS — H54.7 FUNCTIONAL VISION PROBLEM: ICD-10-CM

## 2025-07-21 PROCEDURE — 97112 NEUROMUSCULAR REEDUCATION: CPT | Mod: GO

## 2025-07-21 PROCEDURE — 97530 THERAPEUTIC ACTIVITIES: CPT | Mod: GO
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
## 2025-07-21 NOTE — OP OT TREATMENT LOG
"VISION/CONCUSSION OT FLOW SHEET     OT Vision/mTBI  EXERCISES CURRENT SESSION TIME   NEURO RE-ED  CPT 52586 TOTAL TIME FOR SESSION 40 Minutes   Dynavision      VTS3/VTS4      CPT Targeting school readiness, visual endurance, screen tolerance, and dual tasking as it relates to school prep:     1.) Visual Scan- 20 targets (uppercase letters), 150 array (lowercase letters), white background, medium font, 3 trials:   -- Total time: 10:54.04s  -- Total incorrect: 0   -- Pt reports increased strain in OS following 2 trials. Benefits from 60s eyes closed rest break.     2.) Visual Search: Color- blue/cyan, medium font, 5 digit number key, 5 columns, 15 rows, 3 trials:   -- Avg time: 05:53.0s  -- Avg omissions: 0.3  -- Avg incorrect: 0.7  -- Added cog challenge for pt to type  \"coordinates\" for each number set identified on CPT program onto dual monitor positioned next to CPT monitor. Coordinates based on column/row position. Pt completes this step with high accuracy.        BITs      NVR      Saccadic Fixation      Ocular Motor Control      Visual Tracing      3D Tracking      Visual Perception      Convergence/Divergence      Accommodation      Visual Stimulation Targeting school readiness, visual endurance, screen tolerance as it relates to school prep:     Reading comp - Day 2 Sheet 1   -- Pt tasked with reading through a short story, then answering recall questions.   -- Added screen component for pt to type out responses onto Word Doc.  -- Pt completes with high accuracy and in good time. Correctly answers all comprehension questions.   -- No rise in eye strain reported.        THER ACT  CPT 07079 TOTAL TIME FOR SESSION 15 Minutes   Pain, Vitals, Meds, Etc. Monitored, reviewed.     7/21/25   Pt provides update on eye exam today; pt shares that there are no structural damages to the eye but provider reported decreased vision in OS. Prescribed mild reader (?) in the OS- glasses will be ready for  in 7-10 days.    "     HEP 7/18: Reviewed/reinforced HEP. Pt reporting compliance. Able to complete Pomodoro reading 15 mins on, 5 min break, 15 mins on.     Added- pomodoro reading   TODAY: Reviewed with patient:   Instructed vision HEP, to be completed monocularly/binocularly not on therapy days. Encouraged pt to complete with 2 pt rise rule:  -AROM gaze fixation  -pencil pushups  -short saccades (hor and vert)  -line tracing     Visual Endurance       Work/School Simulation       Assessments     SELF CARE  CPT 67013 TOTAL TIME FOR SESSION Not performed   PCS Symptom Management/Education Pt educated re: results of assessments, deficits noted and role of OT in rehab/habituation of functional deficit areas. In addition, lengthy edu re: how threshold for tolerance can change following injury to the brain with specific re: to multi-sensory intolerance. Discussed 2 pt rise rule to improve tolerance/threshold providing examples. Pt would benefit from reinforcement throughout subsequent sessions as needed.      ADL/IADLs       GROUP  CPT 03000 TOTAL TIME FOR SESSION Not performed

## 2025-07-22 ENCOUNTER — HOSPITAL ENCOUNTER (OUTPATIENT)
Dept: OCCUPATIONAL THERAPY | Facility: REHABILITATION | Age: 20
Setting detail: THERAPIES SERIES
Discharge: HOME | End: 2025-07-22
Attending: PHYSICAL MEDICINE & REHABILITATION
Payer: COMMERCIAL

## 2025-07-22 DIAGNOSIS — H54.7 FUNCTIONAL VISION PROBLEM: ICD-10-CM

## 2025-07-22 DIAGNOSIS — S06.0X0A CONCUSSION WITHOUT LOSS OF CONSCIOUSNESS, INITIAL ENCOUNTER: Primary | ICD-10-CM

## 2025-07-22 PROCEDURE — 97530 THERAPEUTIC ACTIVITIES: CPT | Mod: GO

## 2025-07-22 PROCEDURE — 97112 NEUROMUSCULAR REEDUCATION: CPT | Mod: GO

## 2025-07-22 NOTE — OP OT TREATMENT LOG
VISION/CONCUSSION OT FLOW SHEET     OT Vision/mTBI  EXERCISES CURRENT SESSION TIME   NEURO RE-ED  CPT 95112 TOTAL TIME FOR SESSION 40 Minutes   Dynavision      VTS3/VTS4      CPT        BITs      NVR      Saccadic Fixation Warm up:     Pet day at school- Deducible    -pt tasked with reading 4 clues to complete higher level reasoning puzzle, deciphering unique answers for each character on the worksheet  -completed with inc time, high accuracy     Ocular Motor Control      Visual Tracing      3D Tracking      Visual Perception Hidden Pictures: Napping in [place]   -pt tasked with finding hidden items in alphabetical order   -targeting figure ground, spatial relations and visual discrimination   -completed 50% in allotted time, rise in eye strain reported     Convergence/Divergence      Accommodation      Visual Stimulation executive function +divided attention Money in [place] worksheet with varying upgrades addressing pts high level cognitive skills, executive function, divided alternating attention Pt performed the following:    -began with money in [employer] for 1 clue   -  alternated attention to mental math puzzle for one row  - return to Money in [place] worksheet for ongoing task 100% accuracy at step 7 and step 14 when checked  100% accuracy at the end of the task, did report rise in cognitive fatigue 'brain hurts' and eye strain      Visual/Cognitive Stimulation Chart   -pt tasked with scanning scattered words on wall (6 ft distance) to identify their correct location on the chart at tabletop level following the guidelines on the chart   -targeting visual scanning, tolerance to visual clutter, dual vis and cog processing, accommodation   -pt completes with high accuracy, inc time and +1/10 rise in HA, pressure in eyes, eye strain       THER ACT  CPT 80793 TOTAL TIME FOR SESSION 15 Minutes   Pain, Vitals, Meds, Etc. Monitored, reviewed.     7/21/25   Pt provides update on eye exam today; pt shares that there  are no structural damages to the eye but provider reported decreased vision in OS. Prescribed mild reader (?) in the OS- glasses will be ready for  in 7-10 days.        HEP 7/18: Reviewed/reinforced HEP. Pt reporting compliance. Able to complete Pomodoro reading 15 mins on, 5 min break, 15 mins on.     Added- pomodoro reading   TODAY: Reviewed with patient:   Instructed vision HEP, to be completed monocularly/binocularly not on therapy days. Encouraged pt to complete with 2 pt rise rule:  -AROM gaze fixation  -pencil pushups  -short saccades (hor and vert)  -line tracing     Visual Endurance       Work/School Simulation       Assessments     SELF CARE  CPT 91560 TOTAL TIME FOR SESSION Not performed   PCS Symptom Management/Education Pt educated re: results of assessments, deficits noted and role of OT in rehab/habituation of functional deficit areas. In addition, lengthy edu re: how threshold for tolerance can change following injury to the brain with specific re: to multi-sensory intolerance. Discussed 2 pt rise rule to improve tolerance/threshold providing examples. Pt would benefit from reinforcement throughout subsequent sessions as needed.      ADL/IADLs       GROUP  CPT 20300 TOTAL TIME FOR SESSION Not performed
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
## 2025-07-29 ENCOUNTER — HOSPITAL ENCOUNTER (OUTPATIENT)
Dept: OCCUPATIONAL THERAPY | Facility: REHABILITATION | Age: 20
Setting detail: THERAPIES SERIES
Discharge: HOME | End: 2025-07-29
Attending: PHYSICAL MEDICINE & REHABILITATION
Payer: COMMERCIAL

## 2025-07-29 DIAGNOSIS — H54.7 FUNCTIONAL VISION PROBLEM: Primary | ICD-10-CM

## 2025-07-29 DIAGNOSIS — S06.0X0A CONCUSSION WITHOUT LOSS OF CONSCIOUSNESS, INITIAL ENCOUNTER: ICD-10-CM

## 2025-07-29 PROCEDURE — 97530 THERAPEUTIC ACTIVITIES: CPT | Mod: GO

## 2025-07-29 PROCEDURE — 97112 NEUROMUSCULAR REEDUCATION: CPT | Mod: GO
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
## 2025-07-29 NOTE — OP OT TREATMENT LOG
VISION/CONCUSSION OT FLOW SHEET     OT Vision/mTBI  EXERCISES CURRENT SESSION TIME   NEURO RE-ED  CPT 73883 TOTAL TIME FOR SESSION 40 Minutes   Dynavision      VTS3/VTS4      CPT        BITs      NVR      Saccadic Fixation +School Simulation  [employer] and [employer] in [employer] [place]     -article review + Note taking    -pt used line guide to read columns of test from two short articles while alternating to blank sheet of paper to take notes, then return to the article x 2 articles   -pt reports more effort was required for reading comprehension/cog processing than is typical for her, improved with use of line guide to reduce visual overload.      Ocular Motor Control Word A Round Cards   -pair of word a round cards held at arms length, 12 inches apart for added saccadic challenges   -pt tasked with alternating cards to solve each word ring, starting with outer rings and then to middle, inner x 5 rounds   -inc time required for first two pairs, then able to progress quickly with use of strategies for word solving     Visual Tracing      3D Tracking      Visual Perception      Convergence/Divergence      Accommodation      Visual Stimulation Prioritize Your Day  -with SOFIA Fernández- relevant school topic of relationship science playing for auditory stimuli  -pt tasked with organizing narrative tasks on worksheet into daily schedule by 30 minute increments   -pt utilized note tasking, checklists, visual aids as strategies when completing this task involving executive functions, sequencing, organization, recall memory  -Targeting H saccades between 3 worksheets, dual tasking, multi-stim habituation   Pt then summarized overall themes from SOFIA talk      THER ACT  CPT 58853 TOTAL TIME FOR SESSION 15 Minutes   Pain, Vitals, Meds, Etc. Monitored, reviewed.     Pt arrives with 2/10 OD strain; waiting on new Rx lenses to be picked up    7/21/25   Pt provides update on eye exam today; pt shares that there are no structural damages to  the eye but provider reported decreased vision in OS. Prescribed mild reader (?) in the OS- glasses will be ready for  in 7-10 days.        HEP 7/18: Reviewed/reinforced HEP. Pt reporting compliance. Able to complete Pomodoro reading 15 mins on, 5 min break, 15 mins on.     Added- pomodoro reading   TODAY: Reviewed with patient:   Instructed vision HEP, to be completed monocularly/binocularly not on therapy days. Encouraged pt to complete with 2 pt rise rule:  -AROM gaze fixation  -pencil pushups  -short saccades (hor and vert)  -line tracing     Visual Endurance       Work/School Simulation       Assessments     SELF CARE  CPT 53004 TOTAL TIME FOR SESSION Not performed   PCS Symptom Management/Education Pt educated re: results of assessments, deficits noted and role of OT in rehab/habituation of functional deficit areas. In addition, lengthy edu re: how threshold for tolerance can change following injury to the brain with specific re: to multi-sensory intolerance. Discussed 2 pt rise rule to improve tolerance/threshold providing examples. Pt would benefit from reinforcement throughout subsequent sessions as needed.      ADL/IADLs       GROUP  CPT 96183 TOTAL TIME FOR SESSION Not performed

## 2025-07-31 ENCOUNTER — HOSPITAL ENCOUNTER (OUTPATIENT)
Dept: OCCUPATIONAL THERAPY | Facility: REHABILITATION | Age: 20
Setting detail: THERAPIES SERIES
Discharge: HOME | End: 2025-07-31
Attending: PHYSICAL MEDICINE & REHABILITATION
Payer: COMMERCIAL

## 2025-07-31 DIAGNOSIS — S06.0X0A CONCUSSION WITHOUT LOSS OF CONSCIOUSNESS, INITIAL ENCOUNTER: ICD-10-CM

## 2025-07-31 DIAGNOSIS — H54.7 FUNCTIONAL VISION PROBLEM: Primary | ICD-10-CM

## 2025-07-31 PROCEDURE — 97530 THERAPEUTIC ACTIVITIES: CPT | Mod: GO

## 2025-07-31 PROCEDURE — 97112 NEUROMUSCULAR REEDUCATION: CPT | Mod: GO

## 2025-07-31 NOTE — OP OT TREATMENT LOG
VISION/CONCUSSION OT FLOW SHEET     OT Vision/mTBI  EXERCISES CURRENT SESSION TIME   NEURO RE-ED  CPT 18757 TOTAL TIME FOR SESSION 45 Minutes   Dynavision      VTS3/VTS4      CPT        BITs      NVR      Saccadic Fixation Monkeying Around Field of Dots  -sustained close focus work with new Rx lenses  -pt tasked with connecting a series of line segments to create an image  -focus on visual stimulation, sustained convergence, saccadic fixation  -pt benefits from structured rest breaks for symptom management   -pt able to complete 50% of worksheet in allotted time     Ocular Motor Control      Visual Tracing      3D Tracking      Visual Perception Music Puzzle of Note Alt with Word Search 2D Cube  -pt to locate one word on word search cube, then alternate to decode two crossword puzzle music note clues  -x 8 rounds  -pt completed with high accuracy, inc time   -rise in eye strain, eye fatigue +1/10    Deducible- Costume Party    -pt tasked with reading 10 clues to complete higher level reasoning puzzle, deciphering unique answers for each character on the worksheet using strategies such as crossing off items that cannot be true, note taking on answer chart  -completed with inc time, high accuracy     Convergence/Divergence      Accommodation Arrow Grid Copy   -far complete chart rotated 90 degrees, pt to fill in the blank without use of reference arrows on fill in the blank worksheet  -pt notes image at distance is distorted 2/2 new lenses, instructed pt to lower glasses if unable to see at far point as Rx lenses have a near acuity correction     Visual Stimulation      THER ACT  CPT 65112 TOTAL TIME FOR SESSION 15 Minutes   Pain, Vitals, Meds, Etc. Monitored, reviewed.     Pt arrives with relief from OS strain with use of new Rx lenses today; felt an immediate improvement during work day today.     7/21/25   Pt provides update on eye exam today; pt shares that there are no structural damages to the eye but provider  reported decreased vision in OS. Prescribed mild reader (?) in the OS- glasses will be ready for  in 7-10 days.        HEP 7/18: Reviewed/reinforced HEP. Pt reporting compliance. Able to complete Pomodoro reading 15 mins on, 5 min break, 15 mins on.     Added- pomodoro reading   TODAY: Reviewed with patient:   Instructed vision HEP, to be completed monocularly/binocularly not on therapy days. Encouraged pt to complete with 2 pt rise rule:  -AROM gaze fixation  -pencil pushups  -short saccades (hor and vert)  -line tracing     Visual Endurance       Work/School Simulation       Assessments     SELF CARE  CPT 88489 TOTAL TIME FOR SESSION Not performed   PCS Symptom Management/Education Pt educated re: results of assessments, deficits noted and role of OT in rehab/habituation of functional deficit areas. In addition, lengthy edu re: how threshold for tolerance can change following injury to the brain with specific re: to multi-sensory intolerance. Discussed 2 pt rise rule to improve tolerance/threshold providing examples. Pt would benefit from reinforcement throughout subsequent sessions as needed.      ADL/IADLs       GROUP  CPT 32184 TOTAL TIME FOR SESSION Not performed
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
## 2025-08-04 ENCOUNTER — APPOINTMENT (OUTPATIENT)
Dept: URBAN - METROPOLITAN AREA CLINIC 203 | Age: 20
Setting detail: DERMATOLOGY
End: 2025-08-07

## 2025-08-04 DIAGNOSIS — D22 MELANOCYTIC NEVI: ICD-10-CM

## 2025-08-04 DIAGNOSIS — L74.51 PRIMARY FOCAL HYPERHIDROSIS: ICD-10-CM

## 2025-08-04 DIAGNOSIS — D18.0 HEMANGIOMA: ICD-10-CM

## 2025-08-04 DIAGNOSIS — L57.8 OTHER SKIN CHANGES DUE TO CHRONIC EXPOSURE TO NONIONIZING RADIATION: ICD-10-CM

## 2025-08-04 PROBLEM — D18.01 HEMANGIOMA OF SKIN AND SUBCUTANEOUS TISSUE: Status: ACTIVE | Noted: 2025-08-04

## 2025-08-04 PROBLEM — D22.5 MELANOCYTIC NEVI OF TRUNK: Status: ACTIVE | Noted: 2025-08-04

## 2025-08-04 PROBLEM — L74.512 PRIMARY FOCAL HYPERHIDROSIS, PALMS: Status: ACTIVE | Noted: 2025-08-04

## 2025-08-04 PROBLEM — L74.513 PRIMARY FOCAL HYPERHIDROSIS, SOLES: Status: ACTIVE | Noted: 2025-08-04

## 2025-08-04 PROCEDURE — 99213 OFFICE O/P EST LOW 20 MIN: CPT

## 2025-08-04 PROCEDURE — OTHER DEFER: OTHER

## 2025-08-04 PROCEDURE — OTHER REASSURANCE: OTHER

## 2025-08-04 PROCEDURE — OTHER ADDITIONAL NOTES: OTHER

## 2025-08-04 PROCEDURE — OTHER COUNSELING: OTHER

## 2025-08-04 PROCEDURE — OTHER SUNSCREEN RECOMMENDATIONS: OTHER

## 2025-08-04 ASSESSMENT — LOCATION ZONE DERM
LOCATION ZONE: LEG
LOCATION ZONE: FEET
LOCATION ZONE: SCALP
LOCATION ZONE: HAND
LOCATION ZONE: TRUNK

## 2025-08-04 ASSESSMENT — LOCATION SIMPLE DESCRIPTION DERM
LOCATION SIMPLE: LEFT THIGH
LOCATION SIMPLE: SCALP
LOCATION SIMPLE: LEFT BREAST
LOCATION SIMPLE: LEFT HAND
LOCATION SIMPLE: ABDOMEN
LOCATION SIMPLE: RIGHT HAND
LOCATION SIMPLE: RIGHT PLANTAR SURFACE
LOCATION SIMPLE: LEFT UPPER BACK
LOCATION SIMPLE: LEFT PLANTAR SURFACE

## 2025-08-04 ASSESSMENT — LOCATION DETAILED DESCRIPTION DERM
LOCATION DETAILED: LEFT ANTERIOR DISTAL THIGH
LOCATION DETAILED: LEFT MEDIAL BREAST 10-11:00 REGION
LOCATION DETAILED: EPIGASTRIC SKIN
LOCATION DETAILED: LEFT SUPERIOR MEDIAL UPPER BACK
LOCATION DETAILED: LEFT RADIAL PALM
LOCATION DETAILED: LEFT MEDIAL BREAST 11-12:00 REGION
LOCATION DETAILED: LEFT PLANTAR FOREFOOT OVERLYING 2ND METATARSAL
LOCATION DETAILED: RIGHT PLANTAR FOREFOOT OVERLYING 3RD METATARSAL
LOCATION DETAILED: RIGHT RADIAL PALM
LOCATION DETAILED: RIGHT SUPERIOR PARIETAL SCALP

## 2025-08-05 ENCOUNTER — HOSPITAL ENCOUNTER (OUTPATIENT)
Dept: PSYCHOLOGY | Facility: CLINIC | Age: 20
Discharge: HOME | End: 2025-08-05
Payer: COMMERCIAL

## 2025-08-05 ENCOUNTER — HOSPITAL ENCOUNTER (OUTPATIENT)
Dept: OCCUPATIONAL THERAPY | Facility: REHABILITATION | Age: 20
Setting detail: THERAPIES SERIES
Discharge: HOME | End: 2025-08-05
Attending: PHYSICAL MEDICINE & REHABILITATION
Payer: COMMERCIAL

## 2025-08-05 DIAGNOSIS — F07.81 POST CONCUSSION SYNDROME: Primary | ICD-10-CM

## 2025-08-05 DIAGNOSIS — H54.7 FUNCTIONAL VISION PROBLEM: Primary | ICD-10-CM

## 2025-08-05 DIAGNOSIS — S06.0X0A CONCUSSION WITHOUT LOSS OF CONSCIOUSNESS, INITIAL ENCOUNTER: ICD-10-CM

## 2025-08-05 PROCEDURE — 96133 NRPSYC TST EVAL PHYS/QHP EA: CPT | Performed by: CLINICAL NEUROPSYCHOLOGIST

## 2025-08-05 PROCEDURE — 97112 NEUROMUSCULAR REEDUCATION: CPT | Mod: GO

## 2025-08-05 PROCEDURE — 96136 PSYCL/NRPSYC TST PHY/QHP 1ST: CPT | Performed by: CLINICAL NEUROPSYCHOLOGIST

## 2025-08-05 PROCEDURE — 96137 PSYCL/NRPSYC TST PHY/QHP EA: CPT | Performed by: CLINICAL NEUROPSYCHOLOGIST

## 2025-08-05 PROCEDURE — 96132 NRPSYC TST EVAL PHYS/QHP 1ST: CPT | Performed by: CLINICAL NEUROPSYCHOLOGIST

## 2025-08-05 PROCEDURE — 97535 SELF CARE MNGMENT TRAINING: CPT | Mod: GO

## 2025-08-05 PROCEDURE — 97530 THERAPEUTIC ACTIVITIES: CPT | Mod: GO
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
## 2025-08-05 NOTE — OP OT TREATMENT LOG
VISION/CONCUSSION OT FLOW SHEET     OT Vision/mTBI  EXERCISES CURRENT SESSION TIME   NEURO RE-ED  CPT 11970 TOTAL TIME FOR SESSION 30 Minutes   Dynavision      VTS3/VTS4      CPT        BITs      NVR      Saccadic Fixation      Ocular Motor Control      Visual Tracing      3D Tracking Assessed pt ambulate + 3d tracking + cognitive challenge stating words in alphabetical order. Multiple trials word finding 'food' and 'animals'. Significant delay in word finding, 1 error in sequence. Provided pt with strategies (visualization, personalization, categories) that improved ability although continued delay. Pt reports brain fog, seated rest break.      Visual Perception  I Spy alternating with Hidden Image (both mod complexity): Pt to locate x1 I Spy items, then alternate to locate x2 Hidden Image and write down location to work on word finding :      - Incr challenge with Ispy as opposed to Hidden Image. Pr reports no increase in symptoms.     Convergence/Divergence      Accommodation      Visual Stimulation Assessed pt complete scanning of 2 large board visual clutter ABC scan spotting out letters to 10 words for visual stim and cognitive challenge. Board setup in Upper R quadrant and Lower L quadrant, pt completed while standing. Pt able to complete with occasional cue for spelling. Pt reports brain fog, seated rest break.      THER ACT  CPT 26570 TOTAL TIME FOR SESSION 15 Minutes   Pain, Vitals, Meds, Etc. Monitored, reviewed.   Pt received from neuropsych evaluation, pt notes  recommendation to start ST  .    Hx.   Pt arrives with relief from OS strain with use of new Rx lenses today; felt an immediate improvement during work day today.     7/21/25   Pt provides update on eye exam today; pt shares that there are no structural damages to the eye but provider reported decreased vision in OS. Prescribed mild reader (?) in the OS- glasses will be ready for  in 7-10 days.        HEP See below, encouraged upgrading  pomodoro technique to 60 min reading. Added TedTalk for multistim tolerance.    Hx  7/18: Reviewed/reinforced HEP. Pt reporting compliance. Able to complete Pomodoro reading 15 mins on, 5 min break, 15 mins on.     Added- pomodoro reading   TODAY: Reviewed with patient:   Instructed vision HEP, to be completed monocularly/binocularly not on therapy days. Encouraged pt to complete with 2 pt rise rule:  -AROM gaze fixation  -pencil pushups  -short saccades (hor and vert)  -line tracing     Visual Endurance       Work/School Simulation       Assessments     SELF CARE  CPT 15495 TOTAL TIME FOR SESSION 10 min   PCS Symptom Management/Education Pt to be starting grad program in few weeks. Encouraged pt to start increasing reading to better prepare and assess tolerance for grad curriculum. Encouraged Tedtalks to improve multistim tolerance. Encouraged reading recent textbooks from undergrad or new psychology book , progress to 60 min with rest breaks to assess tolerance and PCS. Edu on 2 pt rise rule and rest breaks.     ADL/IADLs       GROUP  CPT 43956 TOTAL TIME FOR SESSION Not performed
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
## 2025-08-07 ENCOUNTER — HOSPITAL ENCOUNTER (OUTPATIENT)
Dept: OCCUPATIONAL THERAPY | Facility: REHABILITATION | Age: 20
Setting detail: THERAPIES SERIES
Discharge: HOME | End: 2025-08-07
Attending: PHYSICAL MEDICINE & REHABILITATION
Payer: COMMERCIAL

## 2025-08-07 DIAGNOSIS — H54.7 FUNCTIONAL VISION PROBLEM: ICD-10-CM

## 2025-08-07 DIAGNOSIS — S06.0X0A CONCUSSION WITHOUT LOSS OF CONSCIOUSNESS, INITIAL ENCOUNTER: Primary | ICD-10-CM

## 2025-08-07 PROCEDURE — 97112 NEUROMUSCULAR REEDUCATION: CPT | Mod: GO

## 2025-08-07 PROCEDURE — 97530 THERAPEUTIC ACTIVITIES: CPT | Mod: GO

## 2025-08-07 NOTE — OP OT TREATMENT LOG
VISION/CONCUSSION OT FLOW SHEET     OT Vision/mTBI  EXERCISES CURRENT SESSION TIME   NEURO RE-ED  CPT 01192 TOTAL TIME FOR SESSION 20 Minutes   Dynavision Assessed     VTS3/VTS4       CPT      BITs      NVR      Saccadic Fixation Assessed, KDT, Short saccades     Ocular Motor Control Assessed     Visual Tracing      3D Tracking Assessed     Visual Perception      Convergence/Divergence Assessed     Accommodation Assessed     Visual Stimulation       THER ACT  CPT 03897 TOTAL TIME FOR SESSION 15 Minutes   Pain, Vitals, Meds, Etc.  Assessed; monitored; reviewed  Assessed SCOAT-6     Discussed discharge recommendations.      HEP Encouraged HEP to build tolerance for reading and computer screen use.      Hx.   7/18: Reviewed/reinforced HEP. Pt reporting compliance. Able to complete Pomodoro reading 15 mins on, 5 min break, 15 mins on.     Added- pomodoro reading   TODAY: Reviewed with patient:   Instructed vision HEP, to be completed monocularly/binocularly not on therapy days. Encouraged pt to complete with 2 pt rise rule:  -AROM gaze fixation  -pencil pushups  -short saccades (hor and vert)  -line tracing     Visual Endurance        Work/School Simulation        Assessments RIGHTEYE    SELF CARE  CPT 88562 TOTAL TIME FOR SESSION Not performed   PCS Symptom Management/Education      ADL/IADLs       GROUP  CPT 37012 TOTAL TIME FOR SESSION Not performed
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
## 2025-09-02 ENCOUNTER — HOSPITAL ENCOUNTER (OUTPATIENT)
Dept: SPEECH THERAPY | Facility: REHABILITATION | Age: 20
Setting detail: THERAPIES SERIES
Discharge: HOME | End: 2025-09-02
Payer: COMMERCIAL

## 2025-09-02 DIAGNOSIS — S06.0X0S CONCUSSION WITH NO LOSS OF CONSCIOUSNESS, SEQUELA (CMS/HCC): ICD-10-CM

## 2025-09-02 DIAGNOSIS — R41.841 COGNITIVE COMMUNICATION DEFICIT: Primary | ICD-10-CM

## 2025-09-02 PROCEDURE — 96125 COGNITIVE TEST BY HC PRO: CPT | Mod: GN

## 2025-09-03 ENCOUNTER — HOSPITAL ENCOUNTER (OUTPATIENT)
Facility: CLINIC | Age: 20
Discharge: HOME | End: 2025-09-03
Attending: FAMILY MEDICINE
Payer: COMMERCIAL

## 2025-09-03 VITALS
TEMPERATURE: 97.9 F | OXYGEN SATURATION: 100 % | HEART RATE: 68 BPM | SYSTOLIC BLOOD PRESSURE: 100 MMHG | DIASTOLIC BLOOD PRESSURE: 60 MMHG

## 2025-09-03 DIAGNOSIS — J01.00 ACUTE MAXILLARY SINUSITIS, RECURRENCE NOT SPECIFIED: Primary | ICD-10-CM

## 2025-09-03 PROCEDURE — 99203 OFFICE O/P NEW LOW 30 MIN: CPT | Performed by: FAMILY MEDICINE

## 2025-09-03 RX ORDER — AMOXICILLIN 875 MG/1
875 TABLET, COATED ORAL 2 TIMES DAILY
Qty: 14 TABLET | Refills: 0 | Status: SHIPPED | OUTPATIENT
Start: 2025-09-03 | End: 2025-09-03

## 2025-09-03 RX ORDER — AMOXICILLIN 875 MG/1
875 TABLET, COATED ORAL 2 TIMES DAILY
Qty: 14 TABLET | Refills: 0 | Status: SHIPPED | OUTPATIENT
Start: 2025-09-03 | End: 2025-09-10

## 2025-09-04 ASSESSMENT — ENCOUNTER SYMPTOMS
SORE THROAT: 0
SHORTNESS OF BREATH: 0
CHILLS: 0
SINUS PRESSURE: 1
FEVER: 0
WHEEZING: 0
NEUROLOGICAL NEGATIVE: 1
MUSCULOSKELETAL NEGATIVE: 1
SINUS PAIN: 1
GASTROINTESTINAL NEGATIVE: 1
PSYCHIATRIC NEGATIVE: 1
COUGH: 1
CARDIOVASCULAR NEGATIVE: 1